# Patient Record
Sex: FEMALE | Race: BLACK OR AFRICAN AMERICAN | Employment: UNEMPLOYED | URBAN - METROPOLITAN AREA
[De-identification: names, ages, dates, MRNs, and addresses within clinical notes are randomized per-mention and may not be internally consistent; named-entity substitution may affect disease eponyms.]

---

## 2024-09-11 ENCOUNTER — APPOINTMENT (OUTPATIENT)
Dept: ULTRASOUND IMAGING | Age: 34
End: 2024-09-11
Payer: COMMERCIAL

## 2024-09-11 ENCOUNTER — APPOINTMENT (OUTPATIENT)
Dept: GENERAL RADIOLOGY | Age: 34
End: 2024-09-11
Payer: COMMERCIAL

## 2024-09-11 ENCOUNTER — HOSPITAL ENCOUNTER (EMERGENCY)
Age: 34
Discharge: HOME | End: 2024-09-12
Payer: COMMERCIAL

## 2024-09-11 VITALS
RESPIRATION RATE: 19 BRPM | HEART RATE: 105 BPM | OXYGEN SATURATION: 98 % | TEMPERATURE: 98.4 F | SYSTOLIC BLOOD PRESSURE: 103 MMHG | DIASTOLIC BLOOD PRESSURE: 69 MMHG

## 2024-09-11 DIAGNOSIS — I82.4Y2 ACUTE DEEP VEIN THROMBOSIS (DVT) OF PROXIMAL VEIN OF LEFT LOWER EXTREMITY (HCC): Primary | ICD-10-CM

## 2024-09-11 PROCEDURE — 99284 EMERGENCY DEPT VISIT MOD MDM: CPT

## 2024-09-11 PROCEDURE — 73502 X-RAY EXAM HIP UNI 2-3 VIEWS: CPT

## 2024-09-11 PROCEDURE — 93971 EXTREMITY STUDY: CPT

## 2024-09-18 ENCOUNTER — TELEPHONE (OUTPATIENT)
Dept: ONCOLOGY | Age: 34
End: 2024-09-18

## 2024-09-30 ENCOUNTER — HOSPITAL ENCOUNTER (OUTPATIENT)
Dept: INFUSION THERAPY | Age: 34
Discharge: HOME | End: 2024-09-30
Payer: COMMERCIAL

## 2024-09-30 ENCOUNTER — INITIAL CONSULT (OUTPATIENT)
Dept: ONCOLOGY | Age: 34
End: 2024-09-30
Payer: COMMERCIAL

## 2024-09-30 VITALS
DIASTOLIC BLOOD PRESSURE: 76 MMHG | HEIGHT: 56 IN | BODY MASS INDEX: 23.35 KG/M2 | HEART RATE: 81 BPM | OXYGEN SATURATION: 99 % | RESPIRATION RATE: 14 BRPM | SYSTOLIC BLOOD PRESSURE: 111 MMHG | TEMPERATURE: 98 F | WEIGHT: 103.8 LBS

## 2024-09-30 DIAGNOSIS — I82.4Y2 ACUTE DEEP VEIN THROMBOSIS (DVT) OF PROXIMAL VEIN OF LEFT LOWER EXTREMITY (HCC): Primary | ICD-10-CM

## 2024-09-30 DIAGNOSIS — I82.4Y2 ACUTE DEEP VEIN THROMBOSIS (DVT) OF PROXIMAL VEIN OF LEFT LOWER EXTREMITY (HCC): ICD-10-CM

## 2024-09-30 LAB
ALBUMIN SERPL-MCNC: 4.2 G/DL (ref 3.4–5)
ALBUMIN/GLOB SERPL: 1.5 {RATIO} (ref 1.1–2.2)
ALP SERPL-CCNC: 79 U/L (ref 40–129)
ALT SERPL-CCNC: 17 U/L (ref 10–40)
ANION GAP SERPL CALCULATED.3IONS-SCNC: 13 MMOL/L (ref 9–17)
AST SERPL-CCNC: 29 U/L (ref 15–37)
BASOPHILS # BLD: 0.03 K/UL
BASOPHILS NFR BLD: 1 % (ref 0–1)
BILIRUB SERPL-MCNC: 0.4 MG/DL (ref 0–1)
BUN SERPL-MCNC: 9 MG/DL (ref 7–20)
CALCIUM SERPL-MCNC: 9.4 MG/DL (ref 8.3–10.6)
CHLORIDE SERPL-SCNC: 103 MMOL/L (ref 99–110)
CO2 SERPL-SCNC: 20 MMOL/L (ref 21–32)
CREAT SERPL-MCNC: 0.8 MG/DL (ref 0.6–1.1)
EOSINOPHIL # BLD: 0.06 K/UL
EOSINOPHILS RELATIVE PERCENT: 2 % (ref 0–3)
ERYTHROCYTE [DISTWIDTH] IN BLOOD BY AUTOMATED COUNT: 21.3 % (ref 11.7–14.9)
GFR, ESTIMATED: 89 ML/MIN/1.73M2
GLUCOSE SERPL-MCNC: 72 MG/DL (ref 74–99)
HCT VFR BLD AUTO: 28.9 % (ref 37–47)
HCYS SERPL-SCNC: 7.5 UMOL/L
HGB BLD-MCNC: 8 G/DL (ref 12.5–16)
LYMPHOCYTES NFR BLD: 1.37 K/UL
LYMPHOCYTES RELATIVE PERCENT: 43 % (ref 24–44)
MCH RBC QN AUTO: 19.3 PG (ref 27–31)
MCHC RBC AUTO-ENTMCNC: 27.7 G/DL (ref 32–36)
MCV RBC AUTO: 69.6 FL (ref 78–100)
MONOCYTES NFR BLD: 0.23 K/UL
MONOCYTES NFR BLD: 7 % (ref 0–4)
NEUTROPHILS NFR BLD: 47 % (ref 36–66)
NEUTS SEG NFR BLD: 1.52 K/UL
PLATELET # BLD AUTO: 458 K/UL (ref 140–440)
PMV BLD AUTO: 11.2 FL (ref 7.5–11.1)
POTASSIUM SERPL-SCNC: 4.8 MMOL/L (ref 3.5–5.1)
PROT SERPL-MCNC: 7 G/DL (ref 6.4–8.2)
RBC # BLD AUTO: 4.15 M/UL (ref 4.2–5.4)
SODIUM SERPL-SCNC: 136 MMOL/L (ref 136–145)
WBC OTHER # BLD: 3.2 K/UL (ref 4–10.5)

## 2024-09-30 PROCEDURE — 83090 ASSAY OF HOMOCYSTEINE: CPT

## 2024-09-30 PROCEDURE — 80053 COMPREHEN METABOLIC PANEL: CPT

## 2024-09-30 PROCEDURE — 81291 MTHFR GENE: CPT

## 2024-09-30 PROCEDURE — 85300 ANTITHROMBIN III ACTIVITY: CPT

## 2024-09-30 PROCEDURE — 36415 COLL VENOUS BLD VENIPUNCTURE: CPT

## 2024-09-30 PROCEDURE — 99204 OFFICE O/P NEW MOD 45 MIN: CPT | Performed by: INTERNAL MEDICINE

## 2024-09-30 PROCEDURE — 99211 OFF/OP EST MAY X REQ PHY/QHP: CPT

## 2024-09-30 PROCEDURE — 81241 F5 GENE: CPT

## 2024-09-30 PROCEDURE — 85730 THROMBOPLASTIN TIME PARTIAL: CPT

## 2024-09-30 PROCEDURE — 85610 PROTHROMBIN TIME: CPT

## 2024-09-30 PROCEDURE — 85306 CLOT INHIBIT PROT S FREE: CPT

## 2024-09-30 PROCEDURE — 81240 F2 GENE: CPT

## 2024-09-30 PROCEDURE — 85303 CLOT INHIBIT PROT C ACTIVITY: CPT

## 2024-09-30 PROCEDURE — 86146 BETA-2 GLYCOPROTEIN ANTIBODY: CPT

## 2024-09-30 PROCEDURE — 85025 COMPLETE CBC W/AUTO DIFF WBC: CPT

## 2024-09-30 PROCEDURE — 85613 RUSSELL VIPER VENOM DILUTED: CPT

## 2024-09-30 ASSESSMENT — PATIENT HEALTH QUESTIONNAIRE - PHQ9
1. LITTLE INTEREST OR PLEASURE IN DOING THINGS: NOT AT ALL
SUM OF ALL RESPONSES TO PHQ9 QUESTIONS 1 & 2: 0
SUM OF ALL RESPONSES TO PHQ QUESTIONS 1-9: 0
SUM OF ALL RESPONSES TO PHQ QUESTIONS 1-9: 0
2. FEELING DOWN, DEPRESSED OR HOPELESS: NOT AT ALL
SUM OF ALL RESPONSES TO PHQ QUESTIONS 1-9: 0
SUM OF ALL RESPONSES TO PHQ QUESTIONS 1-9: 0

## 2024-09-30 NOTE — PROGRESS NOTES
Patient Name:  Courtney Capone  Patient :  1990  Patient MRN:  [id number]     Primary Oncologist: Mica Izquierdo MD  Referring Provider: No primary care provider on file.     Date of Service: 2024      Reason for Consult:  Left LE DVT      Chief Complaint:    Chief Complaint   Patient presents with    New Patient       Encounter Diagnosis   Name Primary?    Acute deep vein thrombosis (DVT) of proximal vein of left lower extremity (HCC) Yes        HPI:   2024, she arrived with her boyfriend to the clinic today.  Patient Creole speaking.   in the room.  Reported that she noticed edema in the left lower extremity to the foot, painful.  Denied any surgery or injury.  No long distance travel but reported that she drives 1-1/2-hour each way to work.  No prior history of blood clots.  No family history of thromboembolism.  No use of any estrogen or any steroids.  No smoking history.  Denies any chest pain, increased shortness of breath.  Denied any abdominal pain, nausea, vomiting, diarrhea, constipation.  No weight loss.  Ultrasound on 2024 with positive acute proximal DVT in the left lower extremity, no DVT in the calf.  Also superficial thrombophlebitis in the left lower extremity.    Past Medical History:     GERD, peptic ulcer disease                                                             Past Surgery History:     she had surgery for the peptic ulcer disease but prolonged hospitalization for about 5 months then she had repeat surgery in .  Also trach and PEG at that time                                                                    Social History:   Is with boyfriend.  No history of smoking tobacco, alcohol abuse or any other illicit drug abuse.                                                                                                        Family History:    No family history of any thromboembolism or any malignancy

## 2024-09-30 NOTE — PROGRESS NOTES
MA Rooming Questions  Patient: Courtney Capone  MRN: [id number]    Date: 9/30/2024        New Patient        5. Did the patient have a depression screening completed today? Yes    No data recorded     PHQ-9 Given to (if applicable):               PHQ-9 Score (if applicable):                     [] Positive     []  Negative              Does question #9 need addressed (if applicable)                     [] Yes    []  No               Marita Aguero MA

## 2024-10-02 LAB — AT III ACT/NOR PPP CHRO: 141 % (ref 76–128)

## 2024-10-03 LAB
CONFIRM APTT STACLOT: NORMAL S
DRVVT SCREEN TO CONFIRM RATIO: NORMAL {RATIO}
HEPARIN NT PPP QL: NORMAL
LA 3 SCREEN W REFLEX-IMP: NORMAL
LMW HEPARIN IND PLT AB SER QL: NORMAL
MIXING DRVVT: NORMAL S
NEUTRALIZED DRVVT SCREEN RATIO: NORMAL
PROTHROMBIN TIME: 13.8 S (ref 12–15.5)
SCREEN APTT: 0.93 S
SCREEN APTT: NORMAL S
SCREEN DRVVT: 1.08 S
THROMBIN TIME: NORMAL S

## 2024-10-04 LAB
F2 C.20210G>A GENO BLD/T: NEGATIVE
MTHFR INTERPRETATION: NORMAL
MTHFR MUTATION A1286C: NEGATIVE
MTHFR MUTATION C665T: NEGATIVE
SPECIMEN SOURCE: NORMAL
SPECIMEN: NORMAL

## 2024-10-05 LAB
F5 P.R506Q BLD/T QL: NEGATIVE
SPECIMEN SOURCE: NORMAL

## 2024-10-08 LAB
B2 GLYCOPROT1 IGA SERPL IA-ACNC: 3.4 ELISA U/ML (ref 0–7)
B2 GLYCOPROT1 IGG SERPL IA-ACNC: <0.6 ELISA U/ML (ref 0–7)
B2 GLYCOPROT1 IGM SERPL IA-ACNC: <0.9 ELISA U/ML (ref 0–7)

## 2024-11-07 ENCOUNTER — OFFICE VISIT (OUTPATIENT)
Dept: ONCOLOGY | Age: 34
End: 2024-11-07
Payer: COMMERCIAL

## 2024-11-07 ENCOUNTER — HOSPITAL ENCOUNTER (OUTPATIENT)
Dept: INFUSION THERAPY | Age: 34
Discharge: HOME | End: 2024-11-07
Payer: COMMERCIAL

## 2024-11-07 VITALS
TEMPERATURE: 97.6 F | SYSTOLIC BLOOD PRESSURE: 102 MMHG | RESPIRATION RATE: 14 BRPM | OXYGEN SATURATION: 100 % | WEIGHT: 105.8 LBS | HEIGHT: 56 IN | DIASTOLIC BLOOD PRESSURE: 55 MMHG | BODY MASS INDEX: 23.8 KG/M2 | HEART RATE: 61 BPM

## 2024-11-07 DIAGNOSIS — D50.0 IRON DEFICIENCY ANEMIA DUE TO CHRONIC BLOOD LOSS: ICD-10-CM

## 2024-11-07 DIAGNOSIS — K90.9 INTESTINAL MALABSORPTION, UNSPECIFIED TYPE: ICD-10-CM

## 2024-11-07 DIAGNOSIS — I82.4Y2 ACUTE DEEP VEIN THROMBOSIS (DVT) OF PROXIMAL VEIN OF LEFT LOWER EXTREMITY (HCC): ICD-10-CM

## 2024-11-07 DIAGNOSIS — I82.4Y2 ACUTE DEEP VEIN THROMBOSIS (DVT) OF PROXIMAL VEIN OF LEFT LOWER EXTREMITY (HCC): Primary | ICD-10-CM

## 2024-11-07 LAB
25(OH)D3 SERPL-MCNC: 13.2 NG/ML (ref 30–150)
ALBUMIN SERPL-MCNC: 4.3 G/DL (ref 3.4–5)
ALBUMIN/GLOB SERPL: 1.4 {RATIO} (ref 1.1–2.2)
ALP SERPL-CCNC: 84 U/L (ref 40–129)
ALT SERPL-CCNC: 16 U/L (ref 10–40)
ANION GAP SERPL CALCULATED.3IONS-SCNC: 13 MMOL/L (ref 9–17)
AST SERPL-CCNC: 26 U/L (ref 15–37)
BILIRUB SERPL-MCNC: 0.6 MG/DL (ref 0–1)
BUN SERPL-MCNC: 11 MG/DL (ref 7–20)
CALCIUM SERPL-MCNC: 9.6 MG/DL (ref 8.3–10.6)
CHLORIDE SERPL-SCNC: 102 MMOL/L (ref 99–110)
CO2 SERPL-SCNC: 22 MMOL/L (ref 21–32)
CREAT SERPL-MCNC: 0.7 MG/DL (ref 0.6–1.1)
FERRITIN SERPL-MCNC: 7 NG/ML (ref 15–150)
FOLATE SERPL-MCNC: 10.9 NG/ML (ref 4.8–24.2)
GFR, ESTIMATED: >90 ML/MIN/1.73M2
GLUCOSE SERPL-MCNC: 82 MG/DL (ref 74–99)
IRON SATN MFR SERPL: 4 % (ref 15–50)
IRON SERPL-MCNC: 19 UG/DL (ref 37–145)
POTASSIUM SERPL-SCNC: 4.5 MMOL/L (ref 3.5–5.1)
PROT SERPL-MCNC: 7.2 G/DL (ref 6.4–8.2)
SODIUM SERPL-SCNC: 137 MMOL/L (ref 136–145)
TIBC SERPL-MCNC: 495 UG/DL (ref 260–445)
UNSATURATED IRON BINDING CAPACITY: 476 UG/DL (ref 110–370)
VIT B12 SERPL-MCNC: 346 PG/ML (ref 211–911)

## 2024-11-07 PROCEDURE — 99211 OFF/OP EST MAY X REQ PHY/QHP: CPT

## 2024-11-07 PROCEDURE — 82306 VITAMIN D 25 HYDROXY: CPT

## 2024-11-07 PROCEDURE — 83550 IRON BINDING TEST: CPT

## 2024-11-07 PROCEDURE — 82746 ASSAY OF FOLIC ACID SERUM: CPT

## 2024-11-07 PROCEDURE — 36415 COLL VENOUS BLD VENIPUNCTURE: CPT

## 2024-11-07 PROCEDURE — 80053 COMPREHEN METABOLIC PANEL: CPT

## 2024-11-07 PROCEDURE — 82607 VITAMIN B-12: CPT

## 2024-11-07 PROCEDURE — 99214 OFFICE O/P EST MOD 30 MIN: CPT | Performed by: INTERNAL MEDICINE

## 2024-11-07 PROCEDURE — 83021 HEMOGLOBIN CHROMOTOGRAPHY: CPT

## 2024-11-07 PROCEDURE — 83540 ASSAY OF IRON: CPT

## 2024-11-07 PROCEDURE — 82728 ASSAY OF FERRITIN: CPT

## 2024-11-07 NOTE — PROGRESS NOTES
Patient Name:  Courtney Capone  Patient :  1990  Patient MRN:  [id number]     Primary Oncologist: Mica Izquierdo MD  Referring Provider: No primary care provider on file.     Date of Service: 2024      Reason for Consult:  Left LE DVT      Chief Complaint:    Chief Complaint   Patient presents with    Follow-up       Encounter Diagnosis   Name Primary?    Acute deep vein thrombosis (DVT) of proximal vein of left lower extremity (HCC) Yes        HPI:   2024, she arrived with her boyfriend to the clinic today.  Patient Creole speaking.   in the room.  Reported that she noticed edema in the left lower extremity to the foot, painful.  Denied any surgery or injury.  No long distance travel but reported that she drives 1-1/2-hour each way to work.  No prior history of blood clots.  No family history of thromboembolism.  No use of any estrogen or any steroids.  No smoking history.  Denies any chest pain, increased shortness of breath.  Denied any abdominal pain, nausea, vomiting, diarrhea, constipation.  No weight loss.  Ultrasound on 2024 with positive acute proximal DVT in the left lower extremity, no DVT in the calf.  Also superficial thrombophlebitis in the left lower extremity.    2024 CBC with WBC of 3.2 hemoglobin of 8 hematocrit 28.9 MCV of 69.6 and platelets of 458 CMP basically within normal limits beta-2 glycoprotein negative homocysteine levels 7.5 cardiolipin antibodies were ordered but not done Antithrombin III activity 141 factor V Leiden mutation negative MTHFR mutation negative prothrombin gene mutation negative Lupus anticoagulant not detected    Past Medical History:     GERD, peptic ulcer disease                                                             Past Surgery History:     she had surgery for the peptic ulcer disease but prolonged hospitalization for about 5 months then she had repeat surgery in .  Also trach and PEG at that time

## 2024-11-07 NOTE — PROGRESS NOTES
MA Rooming Questions  Patient: Courtney Capone  MRN: [id number]    Date: 11/7/2024        1. Do you have any new issues?   no         2. Do you need any refills on medications?    no    3. Have you had any imaging done since your last visit?   yes - [facility]    4. Have you been hospitalized or seen in the emergency room since your last visit here?   yes - [facility] ER MTH AGO    5. Did the patient have a depression screening completed today? No    No data recorded     PHQ-9 Given to (if applicable):               PHQ-9 Score (if applicable):                     [] Positive     []  Negative              Does question #9 need addressed (if applicable)                     [] Yes    []  No               Marita Aguero MA

## 2024-11-08 DIAGNOSIS — E55.9 VITAMIN D DEFICIENCY: Primary | ICD-10-CM

## 2024-11-08 DIAGNOSIS — D50.0 IRON DEFICIENCY ANEMIA DUE TO CHRONIC BLOOD LOSS: ICD-10-CM

## 2024-11-08 DIAGNOSIS — K90.9 INTESTINAL MALABSORPTION, UNSPECIFIED TYPE: ICD-10-CM

## 2024-11-08 DIAGNOSIS — E53.9 VITAMIN B DEFICIENCY: ICD-10-CM

## 2024-11-08 RX ORDER — SODIUM CHLORIDE 0.9 % (FLUSH) 0.9 %
5-40 SYRINGE (ML) INJECTION PRN
OUTPATIENT
Start: 2024-11-08

## 2024-11-08 RX ORDER — ALBUTEROL SULFATE 90 UG/1
4 INHALANT RESPIRATORY (INHALATION) PRN
OUTPATIENT
Start: 2024-11-08

## 2024-11-08 RX ORDER — SODIUM CHLORIDE 9 MG/ML
5-250 INJECTION, SOLUTION INTRAVENOUS PRN
OUTPATIENT
Start: 2024-11-08

## 2024-11-08 RX ORDER — HEPARIN SODIUM (PORCINE) LOCK FLUSH IV SOLN 100 UNIT/ML 100 UNIT/ML
500 SOLUTION INTRAVENOUS PRN
OUTPATIENT
Start: 2024-11-08

## 2024-11-08 RX ORDER — ONDANSETRON 2 MG/ML
8 INJECTION INTRAMUSCULAR; INTRAVENOUS
OUTPATIENT
Start: 2024-11-08

## 2024-11-08 RX ORDER — DIPHENHYDRAMINE HYDROCHLORIDE 50 MG/ML
50 INJECTION INTRAMUSCULAR; INTRAVENOUS
OUTPATIENT
Start: 2024-11-08

## 2024-11-08 RX ORDER — SODIUM CHLORIDE 9 MG/ML
INJECTION, SOLUTION INTRAVENOUS CONTINUOUS
OUTPATIENT
Start: 2024-11-08

## 2024-11-08 RX ORDER — EPINEPHRINE 1 MG/ML
0.3 INJECTION, SOLUTION, CONCENTRATE INTRAVENOUS PRN
OUTPATIENT
Start: 2024-11-08

## 2024-11-08 RX ORDER — MEPERIDINE HYDROCHLORIDE 50 MG/ML
12.5 INJECTION INTRAMUSCULAR; INTRAVENOUS; SUBCUTANEOUS PRN
OUTPATIENT
Start: 2024-11-08

## 2024-11-08 RX ORDER — FAMOTIDINE 10 MG/ML
20 INJECTION, SOLUTION INTRAVENOUS
OUTPATIENT
Start: 2024-11-08

## 2024-11-08 RX ORDER — CYANOCOBALAMIN 1000 UG/ML
1000 INJECTION, SOLUTION INTRAMUSCULAR; SUBCUTANEOUS ONCE
OUTPATIENT
Start: 2024-11-08 | End: 2024-11-08

## 2024-11-08 RX ORDER — ACETAMINOPHEN 325 MG/1
650 TABLET ORAL
OUTPATIENT
Start: 2024-11-08

## 2024-11-08 RX ORDER — ACETAMINOPHEN 325 MG/1
650 TABLET ORAL ONCE
OUTPATIENT
Start: 2024-11-08 | End: 2024-11-08

## 2024-11-08 RX ORDER — ERGOCALCIFEROL 1.25 MG/1
50000 CAPSULE, LIQUID FILLED ORAL WEEKLY
Qty: 12 CAPSULE | Refills: 1 | Status: SHIPPED | OUTPATIENT
Start: 2024-11-08

## 2024-11-08 NOTE — PROGRESS NOTES
Patient needs B12 injections for vitamin b12 deficiency. Order for B12 monthly placed Therapy plan added. Patient needs iron infusion for iron deficiency anemia d/t chronic blood loss. Order for Infed and NN referral placed. Therapy plan added. This nurse called language line services for an  to assist with calling the patient  ID # [id number]. However there was no answer, the  left a VM requesting the patient to return the call to review lab results.

## 2024-11-09 LAB
CARDIOLIPIN IGA SER IA-ACNC: <10 APL
CARDIOLIPIN IGG SER IA-ACNC: <10 GPL
CARDIOLIPIN IGM SER IA-ACNC: <10 MPL
HGB A1 MFR BLD: 97.2 % (ref 95–97.9)
HGB A2 MFR BLD: 2.3 % (ref 2–3.5)
HGB C MFR BLD: 0 % (ref 0–0)
HGB E MFR BLD: 0 % (ref 0–0)
HGB F MFR BLD: 0.5 % (ref 0–2.1)
HGB FRACT BLD ELPH-IMP: NORMAL
HGB OTHER MFR BLD: 0 % (ref 0–0)
HGB S BLD QL SOLY: NORMAL
HGB S MFR BLD: 0 % (ref 0–0)
PATH INTERP BLD-IMP: NORMAL

## 2024-11-11 ENCOUNTER — TELEPHONE (OUTPATIENT)
Dept: INFUSION THERAPY | Age: 34
End: 2024-11-11

## 2024-11-11 NOTE — TELEPHONE ENCOUNTER
THOR'D FOR B12. PT REQUESTED ADDRESS BE EMAILED TO HER. SCANNED TO HER EMAIL.           PT CB TO [facility] B12 INJ. GOT  ON THE LINE. PT DISCONNECTED MID CALL.  TRIED TO CB AND CALL WENT TO . WILL CB MONDAY

## 2024-11-15 ENCOUNTER — TELEPHONE (OUTPATIENT)
Dept: INFUSION THERAPY | Age: 34
End: 2024-11-15

## 2024-11-21 ENCOUNTER — TELEPHONE (OUTPATIENT)
Dept: INFUSION THERAPY | Age: 34
End: 2024-11-21

## 2024-12-03 ENCOUNTER — HOSPITAL ENCOUNTER (OUTPATIENT)
Dept: INFUSION THERAPY | Age: 34
Discharge: HOME | End: 2024-12-03
Payer: COMMERCIAL

## 2024-12-03 ENCOUNTER — TELEPHONE (OUTPATIENT)
Dept: INFUSION THERAPY | Age: 34
End: 2024-12-03

## 2024-12-03 DIAGNOSIS — E53.9 VITAMIN B DEFICIENCY: Primary | ICD-10-CM

## 2024-12-03 PROCEDURE — 6360000002 HC RX W HCPCS: Performed by: INTERNAL MEDICINE

## 2024-12-03 PROCEDURE — 96372 THER/PROPH/DIAG INJ SC/IM: CPT

## 2024-12-03 RX ORDER — ALBUTEROL SULFATE 90 UG/1
4 INHALANT RESPIRATORY (INHALATION) PRN
OUTPATIENT
Start: 2024-12-31

## 2024-12-03 RX ORDER — EPINEPHRINE 1 MG/ML
0.3 INJECTION, SOLUTION, CONCENTRATE INTRAVENOUS PRN
OUTPATIENT
Start: 2024-12-31

## 2024-12-03 RX ORDER — HYDROCORTISONE SODIUM SUCCINATE 100 MG/2ML
100 INJECTION INTRAMUSCULAR; INTRAVENOUS
OUTPATIENT
Start: 2024-12-31

## 2024-12-03 RX ORDER — ACETAMINOPHEN 325 MG/1
650 TABLET ORAL
OUTPATIENT
Start: 2024-12-31

## 2024-12-03 RX ORDER — SODIUM CHLORIDE 9 MG/ML
INJECTION, SOLUTION INTRAVENOUS CONTINUOUS
OUTPATIENT
Start: 2024-12-31

## 2024-12-03 RX ORDER — FAMOTIDINE 10 MG/ML
20 INJECTION, SOLUTION INTRAVENOUS
OUTPATIENT
Start: 2024-12-31

## 2024-12-03 RX ORDER — CYANOCOBALAMIN 1000 UG/ML
1000 INJECTION, SOLUTION INTRAMUSCULAR; SUBCUTANEOUS ONCE
Status: COMPLETED | OUTPATIENT
Start: 2024-12-03 | End: 2024-12-03

## 2024-12-03 RX ORDER — CYANOCOBALAMIN 1000 UG/ML
1000 INJECTION, SOLUTION INTRAMUSCULAR; SUBCUTANEOUS ONCE
OUTPATIENT
Start: 2024-12-31 | End: 2024-12-31

## 2024-12-03 RX ORDER — ONDANSETRON 2 MG/ML
8 INJECTION INTRAMUSCULAR; INTRAVENOUS
OUTPATIENT
Start: 2024-12-31

## 2024-12-03 RX ORDER — DIPHENHYDRAMINE HYDROCHLORIDE 50 MG/ML
50 INJECTION INTRAMUSCULAR; INTRAVENOUS
OUTPATIENT
Start: 2024-12-31

## 2024-12-03 RX ADMIN — CYANOCOBALAMIN 1000 MCG: 1000 INJECTION, SOLUTION INTRAMUSCULAR at 08:43

## 2024-12-03 NOTE — PROGRESS NOTES
Pt to tx suite for B-12 injection.  used at all times.  session # [id number]. Pt had no concerns or issues to discuss at this time. Injection given IM to left deltoid by Vickie GUERRA. Pt tolerated without incident left ambulatory

## 2024-12-20 ENCOUNTER — TELEPHONE (OUTPATIENT)
Dept: INFUSION THERAPY | Age: 34
End: 2024-12-20

## 2024-12-31 ENCOUNTER — HOSPITAL ENCOUNTER (OUTPATIENT)
Dept: INFUSION THERAPY | Age: 34
Discharge: HOME | End: 2024-12-31
Payer: COMMERCIAL

## 2024-12-31 VITALS
TEMPERATURE: 98.2 F | SYSTOLIC BLOOD PRESSURE: 102 MMHG | DIASTOLIC BLOOD PRESSURE: 64 MMHG | HEART RATE: 70 BPM | OXYGEN SATURATION: 100 % | RESPIRATION RATE: 16 BRPM

## 2024-12-31 DIAGNOSIS — E53.9 VITAMIN B DEFICIENCY: Primary | ICD-10-CM

## 2024-12-31 DIAGNOSIS — D50.0 IRON DEFICIENCY ANEMIA DUE TO CHRONIC BLOOD LOSS: Primary | ICD-10-CM

## 2024-12-31 DIAGNOSIS — K90.9 INTESTINAL MALABSORPTION, UNSPECIFIED TYPE: ICD-10-CM

## 2024-12-31 PROCEDURE — 6360000002 HC RX W HCPCS: Performed by: INTERNAL MEDICINE

## 2024-12-31 PROCEDURE — 96365 THER/PROPH/DIAG IV INF INIT: CPT

## 2024-12-31 PROCEDURE — 96372 THER/PROPH/DIAG INJ SC/IM: CPT

## 2024-12-31 PROCEDURE — 2580000003 HC RX 258: Performed by: INTERNAL MEDICINE

## 2024-12-31 PROCEDURE — 96366 THER/PROPH/DIAG IV INF ADDON: CPT

## 2024-12-31 PROCEDURE — 96376 TX/PRO/DX INJ SAME DRUG ADON: CPT

## 2024-12-31 PROCEDURE — 96375 TX/PRO/DX INJ NEW DRUG ADDON: CPT

## 2024-12-31 PROCEDURE — 6370000000 HC RX 637 (ALT 250 FOR IP): Performed by: INTERNAL MEDICINE

## 2024-12-31 RX ORDER — ONDANSETRON 2 MG/ML
8 INJECTION INTRAMUSCULAR; INTRAVENOUS
OUTPATIENT
Start: 2024-12-31

## 2024-12-31 RX ORDER — MEPERIDINE HYDROCHLORIDE 25 MG/ML
12.5 INJECTION INTRAMUSCULAR; INTRAVENOUS; SUBCUTANEOUS PRN
OUTPATIENT
Start: 2024-12-31

## 2024-12-31 RX ORDER — SODIUM CHLORIDE 0.9 % (FLUSH) 0.9 %
5-40 SYRINGE (ML) INJECTION PRN
OUTPATIENT
Start: 2024-12-31

## 2024-12-31 RX ORDER — CYANOCOBALAMIN 1000 UG/ML
1000 INJECTION, SOLUTION INTRAMUSCULAR; SUBCUTANEOUS ONCE
OUTPATIENT
Start: 2025-01-28 | End: 2025-01-28

## 2024-12-31 RX ORDER — ACETAMINOPHEN 325 MG/1
650 TABLET ORAL ONCE
Status: COMPLETED | OUTPATIENT
Start: 2024-12-31 | End: 2024-12-31

## 2024-12-31 RX ORDER — SODIUM CHLORIDE 9 MG/ML
INJECTION, SOLUTION INTRAVENOUS CONTINUOUS
OUTPATIENT
Start: 2025-01-28

## 2024-12-31 RX ORDER — SODIUM CHLORIDE 9 MG/ML
5-250 INJECTION, SOLUTION INTRAVENOUS PRN
Status: DISCONTINUED | OUTPATIENT
Start: 2024-12-31 | End: 2025-01-01 | Stop reason: HOSPADM

## 2024-12-31 RX ORDER — ACETAMINOPHEN 325 MG/1
650 TABLET ORAL
OUTPATIENT
Start: 2025-01-28

## 2024-12-31 RX ORDER — CYANOCOBALAMIN 1000 UG/ML
1000 INJECTION, SOLUTION INTRAMUSCULAR; SUBCUTANEOUS ONCE
Status: COMPLETED | OUTPATIENT
Start: 2024-12-31 | End: 2024-12-31

## 2024-12-31 RX ORDER — DIPHENHYDRAMINE HYDROCHLORIDE 50 MG/ML
50 INJECTION INTRAMUSCULAR; INTRAVENOUS
OUTPATIENT
Start: 2024-12-31

## 2024-12-31 RX ORDER — HYDROCORTISONE SODIUM SUCCINATE 100 MG/2ML
100 INJECTION INTRAMUSCULAR; INTRAVENOUS
OUTPATIENT
Start: 2024-12-31

## 2024-12-31 RX ORDER — SODIUM CHLORIDE 9 MG/ML
5-250 INJECTION, SOLUTION INTRAVENOUS PRN
Status: CANCELLED | OUTPATIENT
Start: 2024-12-31

## 2024-12-31 RX ORDER — HYDROCORTISONE SODIUM SUCCINATE 100 MG/2ML
100 INJECTION INTRAMUSCULAR; INTRAVENOUS
OUTPATIENT
Start: 2025-01-28

## 2024-12-31 RX ORDER — EPINEPHRINE 1 MG/ML
0.3 INJECTION, SOLUTION, CONCENTRATE INTRAVENOUS PRN
OUTPATIENT
Start: 2025-01-28

## 2024-12-31 RX ORDER — DEXAMETHASONE SODIUM PHOSPHATE 10 MG/ML
10 INJECTION INTRAMUSCULAR; INTRAVENOUS ONCE
Status: CANCELLED | OUTPATIENT
Start: 2024-12-31 | End: 2024-12-31

## 2024-12-31 RX ORDER — ALBUTEROL SULFATE 90 UG/1
4 INHALANT RESPIRATORY (INHALATION) PRN
OUTPATIENT
Start: 2025-01-28

## 2024-12-31 RX ORDER — DIPHENHYDRAMINE HYDROCHLORIDE 50 MG/ML
50 INJECTION INTRAMUSCULAR; INTRAVENOUS
OUTPATIENT
Start: 2025-01-28

## 2024-12-31 RX ORDER — SODIUM CHLORIDE 0.9 % (FLUSH) 0.9 %
5-40 SYRINGE (ML) INJECTION PRN
Status: DISCONTINUED | OUTPATIENT
Start: 2024-12-31 | End: 2025-01-01 | Stop reason: HOSPADM

## 2024-12-31 RX ORDER — HEPARIN 100 UNIT/ML
500 SYRINGE INTRAVENOUS PRN
OUTPATIENT
Start: 2024-12-31

## 2024-12-31 RX ORDER — EPINEPHRINE 1 MG/ML
0.3 INJECTION, SOLUTION, CONCENTRATE INTRAVENOUS PRN
OUTPATIENT
Start: 2024-12-31

## 2024-12-31 RX ORDER — SODIUM CHLORIDE 9 MG/ML
INJECTION, SOLUTION INTRAVENOUS CONTINUOUS
OUTPATIENT
Start: 2024-12-31

## 2024-12-31 RX ORDER — ACETAMINOPHEN 325 MG/1
650 TABLET ORAL ONCE
Status: CANCELLED | OUTPATIENT
Start: 2024-12-31 | End: 2024-12-31

## 2024-12-31 RX ORDER — FAMOTIDINE 10 MG/ML
20 INJECTION, SOLUTION INTRAVENOUS
OUTPATIENT
Start: 2025-01-28

## 2024-12-31 RX ORDER — ONDANSETRON 2 MG/ML
8 INJECTION INTRAMUSCULAR; INTRAVENOUS
OUTPATIENT
Start: 2025-01-28

## 2024-12-31 RX ORDER — ALBUTEROL SULFATE 90 UG/1
4 INHALANT RESPIRATORY (INHALATION) PRN
OUTPATIENT
Start: 2024-12-31

## 2024-12-31 RX ORDER — FAMOTIDINE 10 MG/ML
20 INJECTION, SOLUTION INTRAVENOUS
OUTPATIENT
Start: 2024-12-31

## 2024-12-31 RX ORDER — ACETAMINOPHEN 325 MG/1
650 TABLET ORAL
OUTPATIENT
Start: 2024-12-31

## 2024-12-31 RX ORDER — DEXAMETHASONE SODIUM PHOSPHATE 10 MG/ML
10 INJECTION INTRAMUSCULAR; INTRAVENOUS ONCE
Status: COMPLETED | OUTPATIENT
Start: 2024-12-31 | End: 2024-12-31

## 2024-12-31 RX ORDER — SODIUM CHLORIDE 9 MG/ML
5-250 INJECTION, SOLUTION INTRAVENOUS PRN
OUTPATIENT
Start: 2024-12-31

## 2024-12-31 RX ADMIN — DEXAMETHASONE SODIUM PHOSPHATE 10 MG: 10 INJECTION INTRAMUSCULAR; INTRAVENOUS at 09:20

## 2024-12-31 RX ADMIN — CYANOCOBALAMIN 1000 MCG: 1000 INJECTION, SOLUTION INTRAMUSCULAR at 09:01

## 2024-12-31 RX ADMIN — SODIUM CHLORIDE 975 MG: 9 INJECTION, SOLUTION INTRAVENOUS at 09:28

## 2024-12-31 RX ADMIN — SODIUM CHLORIDE 25 MG: 9 INJECTION, SOLUTION INTRAVENOUS at 08:03

## 2024-12-31 RX ADMIN — ACETAMINOPHEN 650 MG: 325 TABLET ORAL at 09:20

## 2024-12-31 ASSESSMENT — PAIN SCALES - GENERAL: PAINLEVEL_OUTOF10: 0

## 2024-12-31 NOTE — PROGRESS NOTES
Patient arrived to treatment suite for Infed with test dose and vitamin B-12 injection.   Peter #[id number] utilized for admission and assessment.  PIV started in right arm and good blood return noted.  Education on Infed provided.  Patient has no questions or concerns for the doctor at this time.  Treatment approved and given.  Vitamin B-12 injection given IM in right deltoid, band-aid applied.  One-hour post test dose monitoring observed.  Patient tolerated well.   Anmol #[id number] utilized for discharge instructions.  Patient voiced understanding of when to RTC.  Left treatment suite ambulatory.

## 2025-01-12 ENCOUNTER — APPOINTMENT (OUTPATIENT)
Dept: CT IMAGING | Age: 35
End: 2025-01-12
Payer: COMMERCIAL

## 2025-01-12 ENCOUNTER — HOSPITAL ENCOUNTER (EMERGENCY)
Age: 35
Discharge: HOME | End: 2025-01-12
Payer: COMMERCIAL

## 2025-01-12 VITALS
HEART RATE: 80 BPM | DIASTOLIC BLOOD PRESSURE: 75 MMHG | SYSTOLIC BLOOD PRESSURE: 103 MMHG | TEMPERATURE: 98.3 F | RESPIRATION RATE: 16 BRPM | OXYGEN SATURATION: 99 %

## 2025-01-12 DIAGNOSIS — R10.84 GENERALIZED ABDOMINAL PAIN: ICD-10-CM

## 2025-01-12 DIAGNOSIS — N30.00 ACUTE CYSTITIS WITHOUT HEMATURIA: Primary | ICD-10-CM

## 2025-01-12 LAB
ALBUMIN SERPL-MCNC: 4.3 G/DL (ref 3.4–5)
ALBUMIN/GLOB SERPL: 1.6 {RATIO} (ref 1.1–2.2)
ALP SERPL-CCNC: 65 U/L (ref 40–129)
ALT SERPL-CCNC: 16 U/L (ref 10–40)
ANION GAP SERPL CALCULATED.3IONS-SCNC: 12 MMOL/L (ref 9–17)
AST SERPL-CCNC: 23 U/L (ref 15–37)
B-HCG SERPL EIA 3RD IS-ACNC: <1 MIU/ML
BACTERIA URNS QL MICRO: ABNORMAL
BASOPHILS # BLD: 0.04 K/UL
BASOPHILS NFR BLD: 1 % (ref 0–1)
BILIRUB DIRECT SERPL-MCNC: <0.2 MG/DL (ref 0–0.3)
BILIRUB INDIRECT SERPL-MCNC: NORMAL MG/DL (ref 0–0.7)
BILIRUB SERPL-MCNC: 0.6 MG/DL (ref 0–1)
BILIRUB UR QL STRIP: NEGATIVE
BUN SERPL-MCNC: 20 MG/DL (ref 7–20)
CALCIUM SERPL-MCNC: 10.1 MG/DL (ref 8.3–10.6)
CHLORIDE SERPL-SCNC: 101 MMOL/L (ref 99–110)
CLARITY UR: CLEAR
CO2 SERPL-SCNC: 27 MMOL/L (ref 21–32)
COLOR UR: YELLOW
CREAT SERPL-MCNC: 0.8 MG/DL (ref 0.6–1.1)
EOSINOPHIL # BLD: 0.02 K/UL
EOSINOPHILS RELATIVE PERCENT: 0 % (ref 0–3)
EPI CELLS #/AREA URNS HPF: 3 /HPF
ERYTHROCYTE [DISTWIDTH] IN BLOOD BY AUTOMATED COUNT: 32.8 % (ref 11.7–14.9)
GFR, ESTIMATED: 85 ML/MIN/1.73M2
GLUCOSE SERPL-MCNC: 83 MG/DL (ref 74–99)
GLUCOSE UR STRIP-MCNC: NEGATIVE MG/DL
HCG UR QL: NEGATIVE
HCT VFR BLD AUTO: 39 % (ref 37–47)
HGB BLD-MCNC: 10.9 G/DL (ref 12.5–16)
HGB UR QL STRIP.AUTO: NEGATIVE
IMM GRANULOCYTES # BLD AUTO: 0.01 K/UL
IMM GRANULOCYTES NFR BLD: 0 %
KETONES UR STRIP-MCNC: 15 MG/DL
LEUKOCYTE ESTERASE UR QL STRIP: NEGATIVE
LIPASE SERPL-CCNC: 16 U/L (ref 13–60)
LYMPHOCYTES NFR BLD: 0.98 K/UL
LYMPHOCYTES RELATIVE PERCENT: 20 % (ref 24–44)
MCH RBC QN AUTO: 20.9 PG (ref 27–31)
MCHC RBC AUTO-ENTMCNC: 27.9 G/DL (ref 32–36)
MCV RBC AUTO: 74.9 FL (ref 78–100)
MONOCYTES NFR BLD: 0.24 K/UL
MONOCYTES NFR BLD: 5 % (ref 0–4)
MUCOUS THREADS URNS QL MICRO: ABNORMAL
NEUTROPHILS NFR BLD: 74 % (ref 36–66)
NEUTS SEG NFR BLD: 3.66 K/UL
NITRITE UR QL STRIP: POSITIVE
PH UR STRIP: 8 [PH] (ref 5–8)
PLATELET, FLUORESCENCE: 320 K/UL (ref 140–440)
POTASSIUM SERPL-SCNC: 4.4 MMOL/L (ref 3.5–5.1)
PROT SERPL-MCNC: 7 G/DL (ref 6.4–8.2)
PROT UR STRIP-MCNC: NEGATIVE MG/DL
RBC # BLD AUTO: 5.21 M/UL (ref 4.2–5.4)
RBC #/AREA URNS HPF: 1 /HPF (ref 0–2)
SODIUM SERPL-SCNC: 141 MMOL/L (ref 136–145)
SP GR UR STRIP: 1.01 (ref 1–1.03)
UROBILINOGEN UR STRIP-ACNC: 0.2 EU/DL (ref 0–1)
WBC #/AREA URNS HPF: 8 /HPF (ref 0–5)
WBC OTHER # BLD: 5 K/UL (ref 4–10.5)

## 2025-01-12 PROCEDURE — 83690 ASSAY OF LIPASE: CPT

## 2025-01-12 PROCEDURE — 84703 CHORIONIC GONADOTROPIN ASSAY: CPT

## 2025-01-12 PROCEDURE — 6360000004 HC RX CONTRAST MEDICATION

## 2025-01-12 PROCEDURE — 2500000003 HC RX 250 WO HCPCS

## 2025-01-12 PROCEDURE — 99285 EMERGENCY DEPT VISIT HI MDM: CPT

## 2025-01-12 PROCEDURE — 85025 COMPLETE CBC W/AUTO DIFF WBC: CPT

## 2025-01-12 PROCEDURE — 80053 COMPREHEN METABOLIC PANEL: CPT

## 2025-01-12 PROCEDURE — 96374 THER/PROPH/DIAG INJ IV PUSH: CPT

## 2025-01-12 PROCEDURE — 6360000002 HC RX W HCPCS

## 2025-01-12 PROCEDURE — 81001 URINALYSIS AUTO W/SCOPE: CPT

## 2025-01-12 PROCEDURE — 82248 BILIRUBIN DIRECT: CPT

## 2025-01-12 PROCEDURE — 74177 CT ABD & PELVIS W/CONTRAST: CPT

## 2025-01-12 PROCEDURE — 96375 TX/PRO/DX INJ NEW DRUG ADDON: CPT

## 2025-01-12 PROCEDURE — 84702 CHORIONIC GONADOTROPIN TEST: CPT

## 2025-01-12 RX ORDER — MORPHINE SULFATE 4 MG/ML
4 INJECTION, SOLUTION INTRAMUSCULAR; INTRAVENOUS
Status: COMPLETED | OUTPATIENT
Start: 2025-01-12 | End: 2025-01-12

## 2025-01-12 RX ORDER — METOCLOPRAMIDE HYDROCHLORIDE 5 MG/ML
10 INJECTION INTRAMUSCULAR; INTRAVENOUS ONCE
Status: COMPLETED | OUTPATIENT
Start: 2025-01-12 | End: 2025-01-12

## 2025-01-12 RX ORDER — IOPAMIDOL 755 MG/ML
75 INJECTION, SOLUTION INTRAVASCULAR
Status: COMPLETED | OUTPATIENT
Start: 2025-01-12 | End: 2025-01-12

## 2025-01-12 RX ORDER — CEFUROXIME AXETIL 500 MG/1
500 TABLET ORAL 2 TIMES DAILY
Qty: 14 TABLET | Refills: 0 | Status: SHIPPED | OUTPATIENT
Start: 2025-01-12 | End: 2025-01-19

## 2025-01-12 RX ORDER — ONDANSETRON 4 MG/1
4 TABLET, FILM COATED ORAL DAILY PRN
Qty: 30 TABLET | Refills: 0 | Status: SHIPPED | OUTPATIENT
Start: 2025-01-12

## 2025-01-12 RX ORDER — ONDANSETRON 2 MG/ML
4 INJECTION INTRAMUSCULAR; INTRAVENOUS ONCE
Status: COMPLETED | OUTPATIENT
Start: 2025-01-12 | End: 2025-01-12

## 2025-01-12 RX ADMIN — ONDANSETRON 4 MG: 2 INJECTION INTRAMUSCULAR; INTRAVENOUS at 11:23

## 2025-01-12 RX ADMIN — IOPAMIDOL 75 ML: 755 INJECTION, SOLUTION INTRAVENOUS at 15:23

## 2025-01-12 RX ADMIN — WATER 1000 MG: 1 INJECTION INTRAMUSCULAR; INTRAVENOUS; SUBCUTANEOUS at 15:45

## 2025-01-12 RX ADMIN — MORPHINE SULFATE 4 MG: 4 INJECTION, SOLUTION INTRAMUSCULAR; INTRAVENOUS at 11:24

## 2025-01-12 RX ADMIN — METOCLOPRAMIDE HYDROCHLORIDE 10 MG: 5 INJECTION INTRAMUSCULAR; INTRAVENOUS at 15:45

## 2025-01-12 ASSESSMENT — PAIN DESCRIPTION - DESCRIPTORS: DESCRIPTORS: ACHING

## 2025-01-12 ASSESSMENT — PAIN DESCRIPTION - LOCATION: LOCATION: ABDOMEN

## 2025-01-12 ASSESSMENT — PAIN SCALES - GENERAL: PAINLEVEL_OUTOF10: 6

## 2025-01-12 NOTE — ED PROVIDER NOTES
[facility] EMERGENCY DEPARTMENT  EMERGENCY DEPARTMENT ENCOUNTER        Pt Name: Courtney Capone  MRN: [id number]  Birthdate 1990  Date of evaluation: 1/12/2025  Provider: Andrés Araya PA-C  PCP: No primary care provider on file.  Note Started: 10:43 AM EST 1/12/25      MAKI. I have evaluated this patient.        CHIEF COMPLAINT       Chief Complaint   Patient presents with    Abdominal Pain       HISTORY OF PRESENT ILLNESS: 1 or more Elements     Courtney Capone is a 34 y.o. female with iron deficiency anemia and history of DVT no longer anticoagulated who presents complaining of lower abdominal pain.  States that it will sometimes radiate towards her back, denies any fever, chills, states that she has had more so urinary urgency, denies any explicit burning.  Denies any concern for STDs, vaginal bleeding.  States that her last social period was January 4.  Endorses both nausea and vomiting.    Nursing Notes were all reviewed and agreed with or any disagreements were addressed in the HPI.    Historians other than the patient: Significant other at the bedside    Limitations to history : Language hatian creole only    Social determinants of health that will affect patient's care are:  Poor Health Literacy (Additional Time Provided in Explanation)  Poor access to outpatient care/follow up (Provided Outpatient Resources)    I reviewed and interpreted prior non-ED medical record specialty: Outpatient visit with the hematology oncology team from November 7, 2024.  Follow-up for DVT.    PAST MEDICAL HISTORY      has no past medical history on file.     CURRENTMEDICATIONS       Previous Medications    APIXABAN (ELIQUIS) 5 MG TABS TABLET    Take 1 tablet by mouth 2 times daily    APIXABAN STARTER PACK (ELIQUIS) 5 MG TBPK TABLET    Take 1 tablet by mouth See Admin Instructions    OMEPRAZOLE PO    Take by mouth    VITAMIN D (ERGOCALCIFEROL) 1.25 MG (74296 UT) CAPS CAPSULE    Take 1 capsule by mouth

## 2025-01-12 NOTE — CARE COORDINATION
Cm review of pt chart for discharge needs. Community resources placed in pt chart for outpatient needs for continued care and community services. DILLONRN/CM

## 2025-01-12 NOTE — DISCHARGE INSTRUCTIONS
[facility] are available for hospital follow up appointments. Follow up appointments are important and should be schedule BEFORE you leave the hospital. They will help keep you well and at home instead of in the hospital.      Please also schedule a New Patient Appointment with the Primary Care Provider (PCP) of your choice. It normally takes a few weeks to get an appointment with a new PCP, so call TODAY. The PCP list below are all known to be accepting new patients.     If issues arise, call your health insurance for in network PCP options.  [facility] P[facility]    [facility] Physician Finder/Scheduling   [phone]    Dr. Shi, Dr. Aldrichan- [facility]  [phone]  [address], [address]   (All insurances accepted)    Dr. Malcolm Stevens DO - [facility]   [address] [place] [address] [id number] [phone]   (All insurances accepted)    Dr. Flores - [facility]   [phone]  [address]  (No managed or traditional Medicaid)     Katia Rojas [facility] - Medical Offices  [phone]  [address]   (All insurances accepted)    Heber BONILLA & Pete BONILLA - [facility]   [phone]  [address], [address]  [place] [address]  (No [facility] Medicaid Community Plan or [place] Medicaid (they can take [place] Dual)     Dr. Lowery   [phone]   [address], [address]  (pending insurance/ do not accept most Medicaid plans)      Dr. Zainab Yates and Julienne Gar, APRN-[facility] [place].  [phone]

## 2025-01-12 NOTE — ED NOTES
Through the use of an , this RN provided discharge education to patient regarding follow up care and prescription medication. Patient verbalized understanding. Respirations even and unlabored, skin warm and dry.

## 2025-01-23 ENCOUNTER — APPOINTMENT (OUTPATIENT)
Dept: CT IMAGING | Age: 35
DRG: 379 | End: 2025-01-23
Payer: COMMERCIAL

## 2025-01-23 ENCOUNTER — HOSPITAL ENCOUNTER (INPATIENT)
Age: 35
LOS: 1 days | Discharge: HOME | DRG: 379 | End: 2025-01-27
Attending: EMERGENCY MEDICINE | Admitting: HOSPITALIST
Payer: COMMERCIAL

## 2025-01-23 DIAGNOSIS — K56.600 PARTIAL SMALL BOWEL OBSTRUCTION (HCC): ICD-10-CM

## 2025-01-23 DIAGNOSIS — K92.2 GASTROINTESTINAL HEMORRHAGE, UNSPECIFIED GASTROINTESTINAL HEMORRHAGE TYPE: ICD-10-CM

## 2025-01-23 DIAGNOSIS — R11.2 NAUSEA AND VOMITING, UNSPECIFIED VOMITING TYPE: Primary | ICD-10-CM

## 2025-01-23 DIAGNOSIS — R10.13 ABDOMINAL PAIN, EPIGASTRIC: ICD-10-CM

## 2025-01-23 DIAGNOSIS — Z86.718 HX OF DEEP VENOUS THROMBOSIS: ICD-10-CM

## 2025-01-23 DIAGNOSIS — R10.13 EPIGASTRIC PAIN: ICD-10-CM

## 2025-01-23 LAB
ALBUMIN SERPL-MCNC: 4.8 G/DL (ref 3.4–5)
ALBUMIN/GLOB SERPL: 1.6 {RATIO} (ref 1.1–2.2)
ALP SERPL-CCNC: 71 U/L (ref 40–129)
ALT SERPL-CCNC: 15 U/L (ref 10–40)
ANION GAP SERPL CALCULATED.3IONS-SCNC: 15 MMOL/L (ref 9–17)
AST SERPL-CCNC: 34 U/L (ref 15–37)
B-HCG SERPL EIA 3RD IS-ACNC: <1 MIU/ML
BASOPHILS # BLD: 0.02 K/UL
BASOPHILS NFR BLD: 1 % (ref 0–1)
BILIRUB SERPL-MCNC: 1.1 MG/DL (ref 0–1)
BUN SERPL-MCNC: 17 MG/DL (ref 7–20)
CALCIUM SERPL-MCNC: 10.9 MG/DL (ref 8.3–10.6)
CHLORIDE SERPL-SCNC: 98 MMOL/L (ref 99–110)
CO2 SERPL-SCNC: 30 MMOL/L (ref 21–32)
CREAT SERPL-MCNC: 0.8 MG/DL (ref 0.6–1.1)
EOSINOPHIL # BLD: 0 K/UL
EOSINOPHILS RELATIVE PERCENT: 0 % (ref 0–3)
GFR, ESTIMATED: 79 ML/MIN/1.73M2
GLUCOSE SERPL-MCNC: 95 MG/DL (ref 74–99)
HCT VFR BLD AUTO: 46 % (ref 37–47)
HGB BLD-MCNC: 13.3 G/DL (ref 12.5–16)
IMM GRANULOCYTES # BLD AUTO: 0.01 K/UL
IMM GRANULOCYTES NFR BLD: 0 %
LIPASE SERPL-CCNC: 16 U/L (ref 13–60)
LYMPHOCYTES NFR BLD: 1.08 K/UL
LYMPHOCYTES RELATIVE PERCENT: 27 % (ref 24–44)
MCH RBC QN AUTO: 22.5 PG (ref 27–31)
MCHC RBC AUTO-ENTMCNC: 28.9 G/DL (ref 32–36)
MCV RBC AUTO: 78 FL (ref 78–100)
MONOCYTES NFR BLD: 0.2 K/UL
MONOCYTES NFR BLD: 5 % (ref 0–4)
NEUTROPHILS NFR BLD: 67 % (ref 36–66)
NEUTS SEG NFR BLD: 2.66 K/UL
PLATELET, FLUORESCENCE: 323 K/UL (ref 140–440)
POTASSIUM SERPL-SCNC: 4.6 MMOL/L (ref 3.5–5.1)
PROT SERPL-MCNC: 7.8 G/DL (ref 6.4–8.2)
RBC # BLD AUTO: 5.9 M/UL (ref 4.2–5.4)
SODIUM SERPL-SCNC: 143 MMOL/L (ref 136–145)
WBC OTHER # BLD: 4 K/UL (ref 4–10.5)

## 2025-01-23 PROCEDURE — 96375 TX/PRO/DX INJ NEW DRUG ADDON: CPT

## 2025-01-23 PROCEDURE — 80053 COMPREHEN METABOLIC PANEL: CPT

## 2025-01-23 PROCEDURE — 85610 PROTHROMBIN TIME: CPT

## 2025-01-23 PROCEDURE — 96374 THER/PROPH/DIAG INJ IV PUSH: CPT

## 2025-01-23 PROCEDURE — 85025 COMPLETE CBC W/AUTO DIFF WBC: CPT

## 2025-01-23 PROCEDURE — 83690 ASSAY OF LIPASE: CPT

## 2025-01-23 PROCEDURE — 84702 CHORIONIC GONADOTROPIN TEST: CPT

## 2025-01-23 PROCEDURE — 74177 CT ABD & PELVIS W/CONTRAST: CPT

## 2025-01-23 PROCEDURE — 6360000002 HC RX W HCPCS: Performed by: EMERGENCY MEDICINE

## 2025-01-23 RX ORDER — METOCLOPRAMIDE HYDROCHLORIDE 5 MG/ML
10 INJECTION INTRAMUSCULAR; INTRAVENOUS ONCE
Status: COMPLETED | OUTPATIENT
Start: 2025-01-23 | End: 2025-01-23

## 2025-01-23 RX ORDER — IOPAMIDOL 755 MG/ML
75 INJECTION, SOLUTION INTRAVASCULAR
Status: COMPLETED | OUTPATIENT
Start: 2025-01-23 | End: 2025-01-24

## 2025-01-23 RX ORDER — MORPHINE SULFATE 4 MG/ML
4 INJECTION, SOLUTION INTRAMUSCULAR; INTRAVENOUS ONCE
Status: COMPLETED | OUTPATIENT
Start: 2025-01-23 | End: 2025-01-23

## 2025-01-23 RX ORDER — PANTOPRAZOLE SODIUM 40 MG/10ML
40 INJECTION, POWDER, LYOPHILIZED, FOR SOLUTION INTRAVENOUS ONCE
Status: COMPLETED | OUTPATIENT
Start: 2025-01-23 | End: 2025-01-23

## 2025-01-23 RX ORDER — DIPHENHYDRAMINE HYDROCHLORIDE 50 MG/ML
25 INJECTION INTRAMUSCULAR; INTRAVENOUS ONCE
Status: COMPLETED | OUTPATIENT
Start: 2025-01-23 | End: 2025-01-23

## 2025-01-23 RX ORDER — KETOROLAC TROMETHAMINE 15 MG/ML
15 INJECTION, SOLUTION INTRAMUSCULAR; INTRAVENOUS ONCE
Status: COMPLETED | OUTPATIENT
Start: 2025-01-23 | End: 2025-01-23

## 2025-01-23 RX ADMIN — MORPHINE SULFATE 4 MG: 4 INJECTION, SOLUTION INTRAMUSCULAR; INTRAVENOUS at 23:07

## 2025-01-23 RX ADMIN — KETOROLAC TROMETHAMINE 15 MG: 15 INJECTION, SOLUTION INTRAMUSCULAR; INTRAVENOUS at 23:04

## 2025-01-23 RX ADMIN — METOCLOPRAMIDE HYDROCHLORIDE 10 MG: 5 INJECTION, SOLUTION INTRAMUSCULAR; INTRAVENOUS at 23:01

## 2025-01-23 RX ADMIN — PANTOPRAZOLE SODIUM 40 MG: 40 INJECTION, POWDER, FOR SOLUTION INTRAVENOUS at 22:55

## 2025-01-23 RX ADMIN — DIPHENHYDRAMINE HYDROCHLORIDE 25 MG: 50 INJECTION, SOLUTION INTRAMUSCULAR; INTRAVENOUS at 22:57

## 2025-01-23 ASSESSMENT — PAIN - FUNCTIONAL ASSESSMENT: PAIN_FUNCTIONAL_ASSESSMENT: 0-10

## 2025-01-23 ASSESSMENT — PAIN SCALES - GENERAL
PAINLEVEL_OUTOF10: 7
PAINLEVEL_OUTOF10: 7

## 2025-01-23 ASSESSMENT — PAIN DESCRIPTION - LOCATION
LOCATION: ABDOMEN
LOCATION: ABDOMEN

## 2025-01-23 ASSESSMENT — LIFESTYLE VARIABLES
HOW OFTEN DO YOU HAVE A DRINK CONTAINING ALCOHOL: NEVER
HOW MANY STANDARD DRINKS CONTAINING ALCOHOL DO YOU HAVE ON A TYPICAL DAY: PATIENT DOES NOT DRINK

## 2025-01-24 PROBLEM — R10.13 EPIGASTRIC PAIN: Status: ACTIVE | Noted: 2025-01-24

## 2025-01-24 LAB
ALBUMIN SERPL-MCNC: 3.7 G/DL (ref 3.4–5)
ALBUMIN/GLOB SERPL: 1.5 {RATIO} (ref 1.1–2.2)
ALP SERPL-CCNC: 53 U/L (ref 40–129)
ALT SERPL-CCNC: 12 U/L (ref 10–40)
ANION GAP SERPL CALCULATED.3IONS-SCNC: 12 MMOL/L (ref 9–17)
AST SERPL-CCNC: 15 U/L (ref 15–37)
BASOPHILS # BLD: 0.04 K/UL
BASOPHILS NFR BLD: 1 % (ref 0–1)
BILIRUB SERPL-MCNC: 1 MG/DL (ref 0–1)
BILIRUB UR QL STRIP: NEGATIVE
BUN SERPL-MCNC: 20 MG/DL (ref 7–20)
CALCIUM SERPL-MCNC: 8.9 MG/DL (ref 8.3–10.6)
CHLORIDE SERPL-SCNC: 104 MMOL/L (ref 99–110)
CLARITY UR: ABNORMAL
CO2 SERPL-SCNC: 26 MMOL/L (ref 21–32)
COLOR UR: YELLOW
CREAT SERPL-MCNC: 0.8 MG/DL (ref 0.6–1.1)
CRYSTALS URNS MICRO: ABNORMAL /HPF
EOSINOPHIL # BLD: 0.04 K/UL
EOSINOPHILS RELATIVE PERCENT: 1 % (ref 0–3)
EPI CELLS #/AREA URNS HPF: 5 /HPF
GFR, ESTIMATED: 82 ML/MIN/1.73M2
GLUCOSE SERPL-MCNC: 79 MG/DL (ref 74–99)
GLUCOSE UR STRIP-MCNC: NEGATIVE MG/DL
HCT VFR BLD AUTO: 35.9 % (ref 37–47)
HGB BLD-MCNC: 10.3 G/DL (ref 12.5–16)
HGB UR QL STRIP.AUTO: NEGATIVE
IMM GRANULOCYTES # BLD AUTO: 0.01 K/UL
IMM GRANULOCYTES NFR BLD: 0 %
INR PPP: 0.9
INR PPP: 1.1
IRON SATN MFR SERPL: 22 % (ref 15–50)
IRON SERPL-MCNC: 58 UG/DL (ref 37–145)
KETONES UR STRIP-MCNC: NEGATIVE MG/DL
LEUKOCYTE ESTERASE UR QL STRIP: ABNORMAL
LYMPHOCYTES NFR BLD: 1.78 K/UL
LYMPHOCYTES RELATIVE PERCENT: 40 % (ref 24–44)
MAGNESIUM SERPL-MCNC: 1.8 MG/DL (ref 1.8–2.4)
MCH RBC QN AUTO: 22.8 PG (ref 27–31)
MCHC RBC AUTO-ENTMCNC: 28.7 G/DL (ref 32–36)
MCV RBC AUTO: 79.4 FL (ref 78–100)
MONOCYTES NFR BLD: 0.37 K/UL
MONOCYTES NFR BLD: 8 % (ref 0–4)
MUCOUS THREADS URNS QL MICRO: ABNORMAL
NEUTROPHILS NFR BLD: 49 % (ref 36–66)
NEUTS SEG NFR BLD: 2.17 K/UL
NITRITE UR QL STRIP: NEGATIVE
PH UR STRIP: 8 [PH] (ref 5–8)
PLATELET, FLUORESCENCE: 271 K/UL (ref 140–440)
POTASSIUM SERPL-SCNC: 3.5 MMOL/L (ref 3.5–5.1)
PROT SERPL-MCNC: 6.2 G/DL (ref 6.4–8.2)
PROT UR STRIP-MCNC: ABNORMAL MG/DL
PROTHROMBIN TIME: 12.8 SEC (ref 11.7–14.5)
PROTHROMBIN TIME: 14.1 SEC (ref 11.7–14.5)
RBC # BLD AUTO: 4.52 M/UL (ref 4.2–5.4)
RBC #/AREA URNS HPF: 0 /HPF (ref 0–2)
SODIUM SERPL-SCNC: 142 MMOL/L (ref 136–145)
SP GR UR STRIP: 1.01 (ref 1–1.03)
TIBC SERPL-MCNC: 259 UG/DL (ref 260–445)
UNSATURATED IRON BINDING CAPACITY: 201 UG/DL (ref 110–370)
UROBILINOGEN UR STRIP-ACNC: 1 EU/DL (ref 0–1)
WBC #/AREA URNS HPF: 11 /HPF (ref 0–5)
WBC OTHER # BLD: 4.4 K/UL (ref 4–10.5)

## 2025-01-24 PROCEDURE — 96376 TX/PRO/DX INJ SAME DRUG ADON: CPT

## 2025-01-24 PROCEDURE — 36415 COLL VENOUS BLD VENIPUNCTURE: CPT

## 2025-01-24 PROCEDURE — 6360000002 HC RX W HCPCS: Performed by: INTERNAL MEDICINE

## 2025-01-24 PROCEDURE — 85025 COMPLETE CBC W/AUTO DIFF WBC: CPT

## 2025-01-24 PROCEDURE — 6360000004 HC RX CONTRAST MEDICATION: Performed by: EMERGENCY MEDICINE

## 2025-01-24 PROCEDURE — 83735 ASSAY OF MAGNESIUM: CPT

## 2025-01-24 PROCEDURE — 99285 EMERGENCY DEPT VISIT HI MDM: CPT

## 2025-01-24 PROCEDURE — G0378 HOSPITAL OBSERVATION PER HR: HCPCS

## 2025-01-24 PROCEDURE — 2580000003 HC RX 258: Performed by: INTERNAL MEDICINE

## 2025-01-24 PROCEDURE — 80053 COMPREHEN METABOLIC PANEL: CPT

## 2025-01-24 PROCEDURE — 83540 ASSAY OF IRON: CPT

## 2025-01-24 PROCEDURE — 2580000003 HC RX 258: Performed by: STUDENT IN AN ORGANIZED HEALTH CARE EDUCATION/TRAINING PROGRAM

## 2025-01-24 PROCEDURE — 94761 N-INVAS EAR/PLS OXIMETRY MLT: CPT

## 2025-01-24 PROCEDURE — 81001 URINALYSIS AUTO W/SCOPE: CPT

## 2025-01-24 PROCEDURE — 99222 1ST HOSP IP/OBS MODERATE 55: CPT | Performed by: SURGERY

## 2025-01-24 PROCEDURE — 83550 IRON BINDING TEST: CPT

## 2025-01-24 RX ORDER — SODIUM CHLORIDE, SODIUM LACTATE, POTASSIUM CHLORIDE, CALCIUM CHLORIDE 600; 310; 30; 20 MG/100ML; MG/100ML; MG/100ML; MG/100ML
INJECTION, SOLUTION INTRAVENOUS CONTINUOUS
Status: ACTIVE | OUTPATIENT
Start: 2025-01-24 | End: 2025-01-24

## 2025-01-24 RX ORDER — ONDANSETRON 2 MG/ML
4 INJECTION INTRAMUSCULAR; INTRAVENOUS EVERY 6 HOURS PRN
Status: DISCONTINUED | OUTPATIENT
Start: 2025-01-24 | End: 2025-01-27 | Stop reason: HOSPADM

## 2025-01-24 RX ORDER — POTASSIUM CHLORIDE 7.45 MG/ML
10 INJECTION INTRAVENOUS PRN
Status: DISCONTINUED | OUTPATIENT
Start: 2025-01-24 | End: 2025-01-27 | Stop reason: HOSPADM

## 2025-01-24 RX ORDER — POLYETHYLENE GLYCOL 3350 17 G/17G
17 POWDER, FOR SOLUTION ORAL DAILY PRN
Status: DISCONTINUED | OUTPATIENT
Start: 2025-01-24 | End: 2025-01-27 | Stop reason: HOSPADM

## 2025-01-24 RX ORDER — ENOXAPARIN SODIUM 100 MG/ML
40 INJECTION SUBCUTANEOUS DAILY
Status: DISCONTINUED | OUTPATIENT
Start: 2025-01-24 | End: 2025-01-24

## 2025-01-24 RX ORDER — MAGNESIUM SULFATE IN WATER 40 MG/ML
2000 INJECTION, SOLUTION INTRAVENOUS PRN
Status: DISCONTINUED | OUTPATIENT
Start: 2025-01-24 | End: 2025-01-27 | Stop reason: HOSPADM

## 2025-01-24 RX ORDER — SODIUM CHLORIDE 0.9 % (FLUSH) 0.9 %
5-40 SYRINGE (ML) INJECTION EVERY 12 HOURS SCHEDULED
Status: DISCONTINUED | OUTPATIENT
Start: 2025-01-24 | End: 2025-01-27 | Stop reason: HOSPADM

## 2025-01-24 RX ORDER — ONDANSETRON 4 MG/1
4 TABLET, ORALLY DISINTEGRATING ORAL EVERY 8 HOURS PRN
Status: DISCONTINUED | OUTPATIENT
Start: 2025-01-24 | End: 2025-01-27 | Stop reason: HOSPADM

## 2025-01-24 RX ORDER — SODIUM CHLORIDE 9 MG/ML
INJECTION, SOLUTION INTRAVENOUS PRN
Status: DISCONTINUED | OUTPATIENT
Start: 2025-01-24 | End: 2025-01-27 | Stop reason: HOSPADM

## 2025-01-24 RX ORDER — ACETAMINOPHEN 650 MG/1
650 SUPPOSITORY RECTAL EVERY 6 HOURS PRN
Status: DISCONTINUED | OUTPATIENT
Start: 2025-01-24 | End: 2025-01-27 | Stop reason: HOSPADM

## 2025-01-24 RX ORDER — SODIUM CHLORIDE 0.9 % (FLUSH) 0.9 %
5-40 SYRINGE (ML) INJECTION PRN
Status: DISCONTINUED | OUTPATIENT
Start: 2025-01-24 | End: 2025-01-27 | Stop reason: HOSPADM

## 2025-01-24 RX ORDER — PANTOPRAZOLE SODIUM 40 MG/10ML
40 INJECTION, POWDER, LYOPHILIZED, FOR SOLUTION INTRAVENOUS DAILY
Status: DISCONTINUED | OUTPATIENT
Start: 2025-01-24 | End: 2025-01-24

## 2025-01-24 RX ORDER — POTASSIUM CHLORIDE 1500 MG/1
40 TABLET, EXTENDED RELEASE ORAL PRN
Status: DISCONTINUED | OUTPATIENT
Start: 2025-01-24 | End: 2025-01-27 | Stop reason: HOSPADM

## 2025-01-24 RX ORDER — ACETAMINOPHEN 325 MG/1
650 TABLET ORAL EVERY 6 HOURS PRN
Status: DISCONTINUED | OUTPATIENT
Start: 2025-01-24 | End: 2025-01-27 | Stop reason: HOSPADM

## 2025-01-24 RX ADMIN — PANTOPRAZOLE SODIUM 40 MG: 40 INJECTION, POWDER, FOR SOLUTION INTRAVENOUS at 20:45

## 2025-01-24 RX ADMIN — PANTOPRAZOLE SODIUM 40 MG: 40 INJECTION, POWDER, FOR SOLUTION INTRAVENOUS at 10:22

## 2025-01-24 RX ADMIN — IOPAMIDOL 75 ML: 755 INJECTION, SOLUTION INTRAVENOUS at 00:12

## 2025-01-24 RX ADMIN — SODIUM CHLORIDE, POTASSIUM CHLORIDE, SODIUM LACTATE AND CALCIUM CHLORIDE: 600; 310; 30; 20 INJECTION, SOLUTION INTRAVENOUS at 05:39

## 2025-01-24 ASSESSMENT — PAIN - FUNCTIONAL ASSESSMENT
PAIN_FUNCTIONAL_ASSESSMENT: NONE - DENIES PAIN

## 2025-01-24 NOTE — CONSULTS
[facility]    [facility]  Gastroenterology Consultation    2025  10:08 AM  _________________________________________________________________________  Patient:    Courtney Capone  : 1990   34 y.o.             MRN: [id number]  Admitted: 2025  9:42 PM ATT: Alan Maxwell MD   OBS /[id number]  AdmitDx: Abdominal pain, epigastric [R10.13]  Epigastric pain [R10.13]  Partial small bowel obstruction (HCC) [K56.600]  Nausea and vomiting, unspecified vomiting type [R11.2] PCP: No primary care provider on file.  _________________________________________________________________________    Reason for Consult:  nausea  Requesting Physician:  Alan Maxwell MD    _________________________________________________________________________  IMPRESSION and RECOMMENDATIONS:    The patient is a 34 y.o. female with hx per HPI. GI consulted for upper GI bleed.     Coffee ground emesis  JAZMIN - labs in   H/o DVT on Eliquis    Discussion:  Given patient's presentation the differential includes PUD, erosions, esophagitis, gastritis, Elizabeth-Jameson tear, angioectasias, or Dieulafoy's lesion.    Plan:   - Maintain two large bore IV's, type and cross, trend H&H regularly   - H/H relatively stable right now.   - PPI IV BID  - Ok for CLD today  - Hold Eliquis for now.   - IV iron infusion  - Keep NPO at MN for possible EGD tomorrow  - Dr. Leal covering tomorrow. He will follow up and see if she needs EGD. If no EGD over the weekend then plan for outpatient EGD next week in clinic. We can f/u with her on Monday in clinic.     Please notify the GI on-call if a change in the patient's clinical condition indicates that more urgent/emergent endoscopy is needed.     Patient Active Problem List   Diagnosis Code    Vitamin B deficiency E53.9    Intestinal malabsorption K90.9    Iron deficiency anemia due to chronic blood loss D50.0    Epigastric pain R10.13

## 2025-01-24 NOTE — PROGRESS NOTES
Pt eating popsicles and drinking water, no complaints of nausea at this time. Pt states \"The only thing I feel is hungry.\".

## 2025-01-24 NOTE — CONSULTS
Session ID: [id number]  Language: Jacob Stokes   ID: #[id number]   Name: Gabriela Mohs Histo Method Verbiage: Each section was then chromacoded and processed in the Mohs lab using the Mohs protocol and submitted for frozen section.

## 2025-01-24 NOTE — ED NOTES
Resting in bed with eyes closed  NAD noted.   Skin w/d oral mucosa moist pink lips nailbeds pink brisk crf, resp easy unlabored with symmetrical rise and fall of chest wall.        Call light within reach, bed in low position,   Declines needs currently.     Denies pain at present.

## 2025-01-24 NOTE — ED NOTES
Resting in bed with eyes closed, awakens immediately upon speaking name.   NAD noted.   Skin w/d oral mucosa moist pink lips nailbeds pink brisk crf, resp easy unlabored with symmetrical rise and fall of chest wall.       Call light within reach, bed in low position,   Declines needs currently.

## 2025-01-24 NOTE — PROGRESS NOTES
Patient/caregivers speak Jordanian Creole as their preferred language for their healthcare communication. For safe communication, use the [employer]  carts or call:      Jacob Hair Senior -Navigator [phone]    General phone: [phone] ( [phone]) Email: [email or web address]      Please always document the use of interpreting services ('s ID number) in your clinical notes.      Our interpreters are available for team members working with limited English proficient (LEP) patients remotely, via phone or video or in person (if needed for special cases).      When using family members to interpret, for the safety of the patient and protection of the communication of both our patient and [facility] staff the VRI or telephonic  should remain on the line to monitor that all communication is accurate and complete. The  should be instructed to notify [facility] staff immediately if there are any inaccuracies.    Thank you,      Stanley Mclain  Senior /Navigator  Language Services Department  [phone]

## 2025-01-24 NOTE — ED NOTES
ED TO INPATIENT SBAR HANDOFF    Patient Name: Courtney Capone   :  1990  34 y.o.   Preferred Name    Family/Caregiver Present no   Restraints no   C-SSRS: Risk of Suicide: No Risk  Sitter no   Sepsis Risk Score        Situation  Chief Complaint   Patient presents with    Abdominal Pain     X1 day RLQ     Brief Description of Patient's Condition: RLQ abdominal pain   Mental Status: oriented, alert, coherent, logical, thought processes intact, and able to concentrate and follow conversation  Arrived from: home    Imaging:   CT ABDOMEN PELVIS W IV CONTRAST Additional Contrast? None   Final Result        Abnormal labs:   Abnormal Labs Reviewed   COMPREHENSIVE METABOLIC PANEL - Abnormal; Notable for the following components:       Result Value    Chloride 98 (*)     Calcium 10.9 (*)     Total Bilirubin 1.1 (*)     All other components within normal limits   CBC WITH AUTO DIFFERENTIAL - Abnormal; Notable for the following components:    RBC 5.90 (*)     MCH 22.5 (*)     MCHC 28.9 (*)     Neutrophils % 67 (*)     Monocytes % 5 (*)     All other components within normal limits        Background  History: No past medical history on file.    Assessment    Vitals:    Level of Consciousness: Alert (0)   Vitals:    25 0032 25 0041 25 0101 25 0202   BP: 99/61 100/62 97/61 (!) 96/58   Pulse:   81 84   Resp:   16 15   Temp:       TempSrc:       SpO2: 99% 100% 99% 100%       PO Status: Nothing by Mouth    O2 Flow Rate: O2 Device: None (Room air)      Cardiac Rhythm:      Last documented pain medication administered: 2304 toradol, 2307 morphine     NIH Score: NIH       Active LDA's:   Peripheral IV 25 Right;Anterior Forearm (Active)       Pertinent or High Risk Medications/Drips: no   If Yes, please provide details:        Blood Product Administration: no  If Yes, please provide details:      Recommendation    Incomplete orders   Additional Comments:    If any further questions, please call

## 2025-01-24 NOTE — PROGRESS NOTES
Patient seen at bedside, with  services assistance.  Newly admitted overnight, patient seen by GI today, plan for EGD in a.m.  Denies CP, SOB, N/V/D/Abd pain.  Denies hematemesis, or melena.  Hemoglobin stable, iron stores ordered, are normal with the exception of TIBC.  Iron infusion ordered per recommendations of GI, however will hold for now in lieu of iron levels.  Will discuss with GI regarding holding IV iron..  Patient discussed in IDR's, and with attending provider.     Bell Henderson, Acute Care CNP

## 2025-01-24 NOTE — CONSULTS
Department of General Surgery   Surgical Service Dr. Brito   Consult Note    Date of Consult: 1/24/25    Reason for Consult:  dark emesis, nausea  Requesting Physician:  Dr. Emir Greene    CHIEF COMPLAINT:  epigastric pain, nausea, dark emesis    History Obtained From:  patient    HISTORY OF PRESENT ILLNESS:    The patient is a 34 y.o. female who presented with nausea and vomiting for the past 3 days. She began having some dark black vomit concerning for blood and presented to the ER. She has some mild residual epigastric pain. Not currently nauseous. She reports history of gastric ulcer requiring surgery then complication requiring a second intestinal surgery. These were done in [facility].  She reports it has been a long time since she has had an EGD.  She also has a history of DVT last fall.  This presented as leg numbness.  She has been on Eliquis but has been out of this medication and not taking it for the past month.  She does report taking omeprazole.          Past Medical History:    Past Medical History:   Diagnosis Date    DVT (deep venous thrombosis) (HCC)     Gastric ulcer        Past Surgical History:    Past Surgical History:   Procedure Laterality Date    STOMACH SURGERY      surgery x2 on stomach and small bowel in [facility] for bleeding ulcers       Current Medications:   Current Facility-Administered Medications   Medication Dose Route Frequency Provider Last Rate Last Admin    [Held by provider] apixaban (ELIQUIS) tablet 5 mg  5 mg Oral BID Alan Maxwell MD        sodium chloride flush 0.9 % injection 5-40 mL  5-40 mL IntraVENous 2 times per day Alan Maxwell MD        sodium chloride flush 0.9 % injection 5-40 mL  5-40 mL IntraVENous PRN Alan Maxwell MD        0.9 % sodium chloride infusion   IntraVENous PRN Alan Maxwell MD        potassium chloride (KLOR-CON M) extended release tablet 40 mEq  40 mEq Oral PRN Alan Maxwell MD        Or    potassium bicarb-citric acid (EFFER-K) effervescent  tablet 40 mEq  40 mEq Oral PRN Alan Maxwell MD        Or    potassium chloride 10 mEq/100 mL IVPB (Peripheral Line)  10 mEq IntraVENous PRN Alan Maxwell MD        potassium chloride 10 mEq/100 mL IVPB (Peripheral Line)  10 mEq IntraVENous PRN Alan Maxwell MD        magnesium sulfate 2000 mg in 50 mL IVPB premix  2,000 mg IntraVENous PRN Alan Maxwell MD        ondansetron (ZOFRAN-ODT) disintegrating tablet 4 mg  4 mg Oral Q8H PRN Alan Maxwell MD        Or    ondansetron (ZOFRAN) injection 4 mg  4 mg IntraVENous Q6H PRN Alan Maxwell MD        polyethylene glycol (GLYCOLAX) packet 17 g  17 g Oral Daily PRN Alan Maxwell MD        acetaminophen (TYLENOL) tablet 650 mg  650 mg Oral Q6H PRN Alan Maxwell MD        Or    acetaminophen (TYLENOL) suppository 650 mg  650 mg Rectal Q6H PRN Alan Maxwell MD        lactated ringers infusion   IntraVENous Continuous Alan Maxwell  mL/hr at 01/24/25 0539 New Bag at 01/24/25 0539    pantoprazole (PROTONIX) 40 mg in sodium chloride (PF) 0.9 % 10 mL injection  40 mg IntraVENous Q12H Randy Yusuf MD   40 mg at 01/24/25 1022       Allergies:  Patient has no known allergies.    Social History:   Social History     Socioeconomic History    Marital status: Single   Tobacco Use    Smoking status: Never    Smokeless tobacco: Never     Social Determinants of Health     Food Insecurity: Food Insecurity Present (1/24/2025)    Hunger Vital Sign     Worried About Running Out of Food in the Last Year: Sometimes true     Ran Out of Food in the Last Year: Sometimes true   Transportation Needs: No Transportation Needs (1/24/2025)    PRAPARE - Transportation     Lack of Transportation (Medical): No     Lack of Transportation (Non-Medical): No   Housing Stability: Low Risk  (1/24/2025)    Housing Stability Vital Sign     Unable to Pay for Housing in the Last Year: No     Number of Times Moved in the Last Year: 0     Homeless in the Last Year: No       Family History:   No

## 2025-01-24 NOTE — H&P
History and Physical      Name:  Courtney Capone /Age/Sex: 1990  (34 y.o. female)   MRN & CSN:  [id number] & [id number] Encounter Date/Time: 2025 5:03 AM EST   Location:  ED17/ED-17 PCP: No primary care provider on file.       Hospital Day: 2    Assessment and Plan:     Patient is a 34 y.o. female who presented with Abdominal Pain (X1 day RLQ)    Epigastric pain   - Pt presents with epigastric pain and N/V x1 day with an episode of dark emesis in ED. No evidence of infection. CT abdomen shows Multiple mildly dilated loops of proximal small bowel with decompressed distal small bowel but discrete transition point is identified, partial obstruction could also be considered. AT this time patient with BM and passing gas, thus no concern for SBO. Given CT findings and episode of dark emesis will admit for obs. Continue PPI, monitor for progression into SBO or possible UGIB at which will consult GI. Supportive care. If epigastric pain continues, consider testing for H.pylori       Hx of DVT  - Continue eliquis     Checklist:  Advanced directive: full  Diet: NPO   DVT ppx: eliquis       Disposition: place in observation.  Estimated discharge: 2 day(s).  Current living situation: home.  Expected disposition: home.    Spoke with ED provider who recommended admission for the patient and I agree with that plan.  Personally reviewed lab studies and imaging.  EKG interpreted personally and results as stated above.  Imaging that was interpreted personally and results as stated above.    History of Present Illness:     Chief Complaint:    Chief Complaint   Patient presents with    Abdominal Pain     X1 day RLQ       Patient is a 34 y.o. female with a PMHx as above who presented to the ED with epigastric pain. Patient presents with one day of epigastric and right CVA pain associated with emesis. She reports her emesis was yellow in color and then had an episode of black emesis. She denies any diarrhea or sick

## 2025-01-24 NOTE — FLOWSHEET NOTE
4 Eyes Skin Assessment     NAME:  Courtney Capone  YOB: 1990  MEDICAL RECORD NUMBER:  [id number]    The patient is being assessed for  Admission    I agree that at least one RN has performed a thorough Head to Toe Skin Assessment on the patient. ALL assessment sites listed below have been assessed.      Areas assessed by both nurses:    Head, Face, Ears, Shoulders, Back, Chest, Arms, Elbows, Hands, Sacrum. Buttock, Coccyx, Ischium, Legs. Feet and Heels, and Under Medical Devices         Does the Patient have a Wound? No noted wound(s)       Anton Prevention initiated by RN: No  Wound Care Orders initiated by RN: No    Pressure Injury (Stage 3,4, Unstageable, DTI, NWPT, and Complex wounds) if present, place Wound referral order by RN under : No    New Ostomies, if present place, Ostomy referral order under : No     Nurse 1 eSignature: Electronically signed by Amanda Booker RN on 1/24/25 at 10:54 AM EST    **SHARE this note so that the co-signing nurse can place an eSignature**    Nurse 2 eSignature: Electronically signed by Deena Berry LPN on 1/24/25 at 11:04 AM EST

## 2025-01-24 NOTE — PROGRESS NOTES
Spiritual Health History and Assessment/Progress Note  [facility]    Loneliness/Social Isolation,  ,  ,      Name: Courtney Capone MRN: [id number]    Age: 34 y.o.     Sex: female   Language: Creole   Anabaptism: Anabaptist   Epigastric pain     Date: 1/24/2025            Total Time Calculated: 26 min              Spiritual Assessment began in [facility] OBSERVATION        Referral/Consult From: Rounding   Encounter Overview/Reason: Loneliness/Social Isolation  Service Provided For: Patient    Genna, Belief, Meaning:   Patient identifies as spiritual and has beliefs or practices that help with coping during difficult times  Family/Friends No family/friends present      Importance and Influence:  Patient has spiritual/personal beliefs that influence decisions regarding their health  Family/Friends No family/friends present    Community:  Patient feels well-supported. Support system includes: Spouse/Partner and Extended family  Family/Friends No family/friends present    Assessment and Plan of Care:     Patient Interventions include: Facilitated expression of thoughts and feelings. PT expressed that she does have family/friend support but is also feeling lonely. Requested that Chaplains continue to pray for her to feel better and for help with her loneliness as able. PT was informed how to reach out to Chaplains for further support.  Family/Friends Interventions include: No family/friends present    Patient Plan of Care: Spiritual Care available upon further referral  Family/Friends Plan of Care: No family/friends present    Electronically signed by Chaplain Alejandra on 1/24/2025 at 12:20 PM

## 2025-01-24 NOTE — ED NOTES
Returned from CT scan.   All monitoring equipment reapplied.     Continues to deny pain at this time.     NAD noted.   Skin w/d oral mucosa moist pink lips nailbeds pink brisk crf, resp easy unlabored with symmetrical rise and fall of chest wall.       Call light within reach, bed in low position,   Declines needs currently.

## 2025-01-24 NOTE — PROGRESS NOTES
Patient transferred to observation room 8. Per nurse in ED  services not available at this time. Vitals obtained, patient resting comfortably, able to ask nurse to turn light off and thanked nurse. Nursing supervisor locating  ipad to bring to the unit. Will re attempt contacting interpreting services.

## 2025-01-24 NOTE — ED PROVIDER NOTES
[facility] EMERGENCY DEPARTMENT  EMERGENCY DEPARTMENT ENCOUNTER      Pt Name: Courtney Capone  MRN: [id number]  Birthdate 1990  Date of evaluation: 1/23/2025  Provider: Breanna Greene MD    CHIEF COMPLAINT       Chief Complaint   Patient presents with    Abdominal Pain     X1 day RLQ         HISTORY OF PRESENT ILLNESS      Courtney Capone is a 34 y.o. female who presents to the emergency department  for   Chief Complaint   Patient presents with    Abdominal Pain     X1 day RLQ       34-year-old female presents complaining of epigastric abdominal pain as well as nausea and vomiting.  She is Romanian Creole speaking.   was used.  She seems have a history of partial gastrectomy and some potential partial bowel obstruction.  She reports that she had the surgery done in [facility] where she lived in 2020.  She has not had a scope recently.  She denies that she is currently following with a general surgeon or gastroenterologist.  She reports is on omeprazole at home.  She is also anticoagulated for history of blood clot.  She states she has had several days of the nausea and vomiting.  Does endorse some bowel function.  She denies any alleviating factors.  No chest pain.  No fevers.  No abdominal trauma or injury.  She has presents with some active emesis          Nursing Notes, Triage Notes & Vital Signs were reviewed.      REVIEW OF SYSTEMS    (2-9 systems for level 4, 10 or more for level 5)     Review of Systems   Gastrointestinal:  Positive for abdominal pain, nausea and vomiting.       Except as noted above the remainder of the review of systems was reviewed and negative.       PAST MEDICAL HISTORY   No past medical history on file.    Prior to Admission medications    Medication Sig Start Date End Date Taking? Authorizing Provider   ondansetron (ZOFRAN) 4 MG tablet Take 1 tablet by mouth daily as needed for Nausea or Vomiting 1/12/25   Andrés Araya PA-C   vitamin D

## 2025-01-25 ENCOUNTER — ANESTHESIA (OUTPATIENT)
Dept: ENDOSCOPY | Age: 35
End: 2025-01-25
Payer: COMMERCIAL

## 2025-01-25 ENCOUNTER — ANESTHESIA EVENT (OUTPATIENT)
Dept: ENDOSCOPY | Age: 35
End: 2025-01-25
Payer: COMMERCIAL

## 2025-01-25 ENCOUNTER — APPOINTMENT (OUTPATIENT)
Dept: ULTRASOUND IMAGING | Age: 35
DRG: 379 | End: 2025-01-25
Payer: COMMERCIAL

## 2025-01-25 PROBLEM — R11.2 NAUSEA AND VOMITING: Status: ACTIVE | Noted: 2025-01-25

## 2025-01-25 LAB
ALBUMIN SERPL-MCNC: 3.7 G/DL (ref 3.4–5)
ALBUMIN/GLOB SERPL: 1.7 {RATIO} (ref 1.1–2.2)
ALP SERPL-CCNC: 50 U/L (ref 40–129)
ALT SERPL-CCNC: 8 U/L (ref 10–40)
ANION GAP SERPL CALCULATED.3IONS-SCNC: 10 MMOL/L (ref 9–17)
AST SERPL-CCNC: 16 U/L (ref 15–37)
BASOPHILS # BLD: 0.05 K/UL
BASOPHILS NFR BLD: 2 % (ref 0–1)
BILIRUB SERPL-MCNC: 1.2 MG/DL (ref 0–1)
BUN SERPL-MCNC: 15 MG/DL (ref 7–20)
CALCIUM SERPL-MCNC: 9.3 MG/DL (ref 8.3–10.6)
CHLORIDE SERPL-SCNC: 106 MMOL/L (ref 99–110)
CO2 SERPL-SCNC: 26 MMOL/L (ref 21–32)
CREAT SERPL-MCNC: 0.8 MG/DL (ref 0.6–1.1)
EOSINOPHIL # BLD: 0.04 K/UL
EOSINOPHILS RELATIVE PERCENT: 1 % (ref 0–3)
GFR, ESTIMATED: 87 ML/MIN/1.73M2
GLUCOSE SERPL-MCNC: 79 MG/DL (ref 74–99)
HCT VFR BLD AUTO: 36.5 % (ref 37–47)
HGB BLD-MCNC: 10.5 G/DL (ref 12.5–16)
IMM GRANULOCYTES # BLD AUTO: 0.01 K/UL
IMM GRANULOCYTES NFR BLD: 0 %
LYMPHOCYTES NFR BLD: 1.65 K/UL
LYMPHOCYTES RELATIVE PERCENT: 55 % (ref 24–44)
MCH RBC QN AUTO: 23 PG (ref 27–31)
MCHC RBC AUTO-ENTMCNC: 28.8 G/DL (ref 32–36)
MCV RBC AUTO: 80 FL (ref 78–100)
MONOCYTES NFR BLD: 0.21 K/UL
MONOCYTES NFR BLD: 7 % (ref 0–4)
NEUTROPHILS NFR BLD: 35 % (ref 36–66)
NEUTS SEG NFR BLD: 1.05 K/UL
PLATELET ESTIMATE: ABNORMAL
PLATELET, FLUORESCENCE: 250 K/UL (ref 140–440)
POTASSIUM SERPL-SCNC: 4.1 MMOL/L (ref 3.5–5.1)
PROT SERPL-MCNC: 5.9 G/DL (ref 6.4–8.2)
RBC # BLD AUTO: 4.56 M/UL (ref 4.2–5.4)
RBC # BLD: ABNORMAL 10*6/UL
RBC # BLD: ABNORMAL 10*6/UL
SODIUM SERPL-SCNC: 142 MMOL/L (ref 136–145)
WBC # BLD: NORMAL 10*3/UL
WBC OTHER # BLD: 3 K/UL (ref 4–10.5)

## 2025-01-25 PROCEDURE — 43239 EGD BIOPSY SINGLE/MULTIPLE: CPT | Performed by: INTERNAL MEDICINE

## 2025-01-25 PROCEDURE — 94761 N-INVAS EAR/PLS OXIMETRY MLT: CPT

## 2025-01-25 PROCEDURE — 6360000002 HC RX W HCPCS: Performed by: NURSE ANESTHETIST, CERTIFIED REGISTERED

## 2025-01-25 PROCEDURE — 85025 COMPLETE CBC W/AUTO DIFF WBC: CPT

## 2025-01-25 PROCEDURE — 93970 EXTREMITY STUDY: CPT

## 2025-01-25 PROCEDURE — 96376 TX/PRO/DX INJ SAME DRUG ADON: CPT

## 2025-01-25 PROCEDURE — 36415 COLL VENOUS BLD VENIPUNCTURE: CPT

## 2025-01-25 PROCEDURE — 80053 COMPREHEN METABOLIC PANEL: CPT

## 2025-01-25 PROCEDURE — 88305 TISSUE EXAM BY PATHOLOGIST: CPT

## 2025-01-25 PROCEDURE — 0DB78ZX EXCISION OF STOMACH, PYLORUS, VIA NATURAL OR ARTIFICIAL OPENING ENDOSCOPIC, DIAGNOSTIC: ICD-10-PCS | Performed by: INTERNAL MEDICINE

## 2025-01-25 PROCEDURE — 3700000001 HC ADD 15 MINUTES (ANESTHESIA): Performed by: INTERNAL MEDICINE

## 2025-01-25 PROCEDURE — 3609012400 HC EGD TRANSORAL BIOPSY SINGLE/MULTIPLE: Performed by: INTERNAL MEDICINE

## 2025-01-25 PROCEDURE — G0378 HOSPITAL OBSERVATION PER HR: HCPCS

## 2025-01-25 PROCEDURE — 2709999900 HC NON-CHARGEABLE SUPPLY: Performed by: INTERNAL MEDICINE

## 2025-01-25 PROCEDURE — 6360000002 HC RX W HCPCS: Performed by: INTERNAL MEDICINE

## 2025-01-25 PROCEDURE — 3700000000 HC ANESTHESIA ATTENDED CARE: Performed by: INTERNAL MEDICINE

## 2025-01-25 PROCEDURE — 2500000003 HC RX 250 WO HCPCS: Performed by: STUDENT IN AN ORGANIZED HEALTH CARE EDUCATION/TRAINING PROGRAM

## 2025-01-25 PROCEDURE — 2580000003 HC RX 258: Performed by: INTERNAL MEDICINE

## 2025-01-25 PROCEDURE — 88342 IMHCHEM/IMCYTCHM 1ST ANTB: CPT

## 2025-01-25 PROCEDURE — 0DB98ZX EXCISION OF DUODENUM, VIA NATURAL OR ARTIFICIAL OPENING ENDOSCOPIC, DIAGNOSTIC: ICD-10-PCS | Performed by: INTERNAL MEDICINE

## 2025-01-25 RX ORDER — LIDOCAINE HYDROCHLORIDE 20 MG/ML
INJECTION, SOLUTION INTRAVENOUS
Status: DISCONTINUED | OUTPATIENT
Start: 2025-01-25 | End: 2025-01-25 | Stop reason: SDUPTHER

## 2025-01-25 RX ORDER — PROPOFOL 10 MG/ML
INJECTION, EMULSION INTRAVENOUS
Status: DISCONTINUED | OUTPATIENT
Start: 2025-01-25 | End: 2025-01-25 | Stop reason: SDUPTHER

## 2025-01-25 RX ADMIN — SODIUM CHLORIDE, PRESERVATIVE FREE 10 ML: 5 INJECTION INTRAVENOUS at 08:47

## 2025-01-25 RX ADMIN — PANTOPRAZOLE SODIUM 40 MG: 40 INJECTION, POWDER, FOR SOLUTION INTRAVENOUS at 08:47

## 2025-01-25 RX ADMIN — LIDOCAINE HYDROCHLORIDE 100 MG: 20 INJECTION, SOLUTION INTRAVENOUS at 10:18

## 2025-01-25 RX ADMIN — PROPOFOL 100 MG: 10 INJECTION, EMULSION INTRAVENOUS at 10:18

## 2025-01-25 RX ADMIN — PANTOPRAZOLE SODIUM 40 MG: 40 INJECTION, POWDER, FOR SOLUTION INTRAVENOUS at 21:30

## 2025-01-25 RX ADMIN — SODIUM CHLORIDE, PRESERVATIVE FREE 10 ML: 5 INJECTION INTRAVENOUS at 21:30

## 2025-01-25 NOTE — ANESTHESIA POSTPROCEDURE EVALUATION
Department of Anesthesiology  Postprocedure Note    Patient: Courtney Capone  MRN: [id number]  YOB: 1990  Date of evaluation: 1/25/2025    Procedure Summary       Date: 01/25/25 Room / Location: [place] [address] / [facility]    Anesthesia Start: 1012 Anesthesia Stop: 1030    Procedure: ESOPHAGOGASTRODUODENOSCOPY BIOPSY Diagnosis: Gastrointestinal hemorrhage, unspecified gastrointestinal hemorrhage type    Surgeons: Clare Leal MD Responsible Provider: Olivia Hale MD    Anesthesia Type: MAC ASA Status: 2            Anesthesia Type: No value filed.    Danii Phase I:      Danii Phase II:      Anesthesia Post Evaluation    Patient location during evaluation: bedside  Patient participation: complete - patient participated  Level of consciousness: awake  Airway patency: patent  Nausea & Vomiting: no nausea  Cardiovascular status: blood pressure returned to baseline  Respiratory status: acceptable  Hydration status: euvolemic  Pain management: adequate    No notable events documented.

## 2025-01-25 NOTE — ADDENDUM NOTE
Addendum  created 01/25/25 1148 by Olivia Hale MD    Clinical Note Signed, Review and Sign - Ready for Procedure

## 2025-01-25 NOTE — PROGRESS NOTES
V2.0  [facility] Hospitalist Progress Note      Name:  Courtney Capone /Age/Sex: 1990  (34 y.o. female)   MRN & CSN:  [id number] & [id number] Encounter Date/Time: 2025 3:18 PM EST    Location:  OBS [id number] PCP: No primary care provider on file.       Hospital Day: 3    Assessment and Plan:   Courtney Capone is a 34 y.o. female with pmh as noted below who presents with Epigastric pain      Plan:    Epigastric pain   - Pt presents with epigastric pain and N/V x1 day with an episode of dark emesis in ED.  -- No evidence of infection. CT abdomen shows Multiple mildly dilated loops of proximal small bowel with decompressed distal small bowel but discrete transition point is identified, partial obstruction could also be considered. AT this time patient with BM and passing gas, thus no concern for SBO. Given CT findings and episode of dark emesis will admit for obs.  -- Continue PPI, monitor for progression into SBO or possible UGIB at which will consult GI.   --Supportive care. If epigastric pain continues, consider testing for H.pylori   -- patient seen per Dr. Yusuf yesterday, had EGD today with .  Recommend follow-up in 2 weeks at GI clinic, and to continue Protonix.  No bleeding noted on EGD.  Patient tolerating regular diet, no emesis, abdominal pain.  --UA shows small leukocyte esterase, WBC 11.  Patient denies frequency urgency, or burning with urination.  Continue to monitor, WBC 3.0, will repeat in a.m. hemoglobin stable at 10.5        Hx of DVT  - On eliquis, holding currently, Will resume if ok with GI and Hem.   --hem consult place for recs.  Patient has a history of DVT, per chart review patient follows with Dr.J. Izquierdo apparently has not taken Eliquis for some time.  Consult placed for recommendations to resume Eliquis when able, will also get ultrasound of lower extremities     Diet ADULT DIET; Regular   DVT Prophylaxis [] Lovenox, []  Heparin, [] SCDs, [] Ambulation,  []

## 2025-01-25 NOTE — ANESTHESIA PRE PROCEDURE
Department of Anesthesiology  Preprocedure Note       Name:  Courtney Capone   Age:  34 y.o.  :  1990                                          MRN:  [id number]         Date:  2025      Surgeon: Surgeon(s):  Clare Leal MD    Procedure: Procedure(s):  ESOPHAGOGASTRODUODENOSCOPY    Medications prior to admission:   Prior to Admission medications    Medication Sig Start Date End Date Taking? Authorizing Provider   ondansetron (ZOFRAN) 4 MG tablet Take 1 tablet by mouth daily as needed for Nausea or Vomiting 25  Yes Andrés Araya PA-C   vitamin D (ERGOCALCIFEROL) 1.25 MG (28707 UT) CAPS capsule Take 1 capsule by mouth once a week 24  Yes Mica Izquierdo MD   apixaban (ELIQUIS) 5 MG TABS tablet Take 1 tablet by mouth 2 times daily 24 Yes Mica Izquierdo MD   OMEPRAZOLE PO Take by mouth   Yes Provider, MD Humaira   apixaban starter pack (ELIQUIS) 5 MG TBPK tablet Take 1 tablet by mouth See Admin Instructions 24  Yes Karla Parada PA-C       Current medications:    Current Facility-Administered Medications   Medication Dose Route Frequency Provider Last Rate Last Admin   • [Held by provider] apixaban (ELIQUIS) tablet 5 mg  5 mg Oral BID Alan Maxwell MD       • sodium chloride flush 0.9 % injection 5-40 mL  5-40 mL IntraVENous 2 times per day Alan Maxwell MD   10 mL at 25 0847   • sodium chloride flush 0.9 % injection 5-40 mL  5-40 mL IntraVENous PRN Alan Maxwell MD       • 0.9 % sodium chloride infusion   IntraVENous PRN Alan Maxwell MD       • potassium chloride (KLOR-CON M) extended release tablet 40 mEq  40 mEq Oral PRN Alan Maxwell MD        Or   • potassium bicarb-citric acid (EFFER-K) effervescent tablet 40 mEq  40 mEq Oral PRN Alan Maxwell MD        Or   • potassium chloride 10 mEq/100 mL IVPB (Peripheral Line)  10 mEq IntraVENous PRN Alan Maxwell MD       • potassium chloride 10 mEq/100 mL IVPB (Peripheral Line)  10 mEq IntraVENous PRN Hans,

## 2025-01-25 NOTE — BRIEF OP NOTE
Brief Postoperative Note      Patient: Courtney Capone  YOB: 1990  MRN: [id number]    Date of Procedure: 1/25/2025    Pre-Op Diagnosis Codes:      * Gastrointestinal hemorrhage, unspecified gastrointestinal hemorrhage type [K92.2]    Post-Op Diagnosis: Same       Procedure(s):  ESOPHAGOGASTRODUODENOSCOPY BIOPSY    Surgeon(s):  Clare Leal MD    Assistant:  * No surgical staff found *    Anesthesia: Monitor Anesthesia Care    Estimated Blood Loss (mL): Minimal    Complications: None    Specimens:   ID Type Source Tests Collected by Time Destination   A : BIOPSY NODULAR MUCOSA DUODENUM - FORCEPS Tissue Duodenum SURGICAL PATHOLOGY Clare Leal MD 1/25/2025 1024    B : BIOPSY ANTRUM AND BODY FOR GASTRITIS, H PYLORI - FORCEPS Tissue Stomach SURGICAL PATHOLOGY Clare Leal MD 1/25/2025 1025        Implants:  * No implants in log *      Drains: * No LDAs found *    Findings:  Infection Present At Time Of Surgery (PATOS) (choose all levels that have infection present):  No infection present    Impression:         -  LA Grade B esophagitis with no bleeding.          -  Gastritis, characterized by erythema and friability.  Biopsied.          - A gastrojejunostomy was found, characterized by healthy appearing             mucosa. The Pylorus was intact as well and the scope was advanced             both into the duodenum and the Jejunum.          -  Normal second portion of the duodenum.          -  Mucosal changes in the duodenum.  Biopsied.          -  Normal examined jejunum.     Recommendation:         -  Patient has a contact number available for emergencies.  The             signs and symptoms of potential delayed complications were discussed             with the patient.  Return to normal activities tomorrow.  Written             discharge instructions were provided to the patient.          -  Await pathology results.          -  Telephone GI clinic for pathology results in 1 week.          -  Use

## 2025-01-25 NOTE — ANESTHESIA PRE PROCEDURE
Department of Anesthesiology  Preprocedure Note       Name:  Courtney Capone   Age:  34 y.o.  :  1990                                          MRN:  [id number]         Date:  2025      Surgeon: Surgeon(s):  Clare Leal MD    Procedure: Procedure(s):  ESOPHAGOGASTRODUODENOSCOPY BIOPSY    Medications prior to admission:   Prior to Admission medications    Medication Sig Start Date End Date Taking? Authorizing Provider   ondansetron (ZOFRAN) 4 MG tablet Take 1 tablet by mouth daily as needed for Nausea or Vomiting 25  Yes Andrés Araya PA-C   vitamin D (ERGOCALCIFEROL) 1.25 MG (58514 UT) CAPS capsule Take 1 capsule by mouth once a week 24  Yes Mica Izquierdo MD   apixaban (ELIQUIS) 5 MG TABS tablet Take 1 tablet by mouth 2 times daily 24 Yes Mica Izquierdo MD   OMEPRAZOLE PO Take by mouth   Yes Provider, MD Humaira   apixaban starter pack (ELIQUIS) 5 MG TBPK tablet Take 1 tablet by mouth See Admin Instructions 24  Yes Karla Parada PA-C       Current medications:    Current Facility-Administered Medications   Medication Dose Route Frequency Provider Last Rate Last Admin   • [Held by provider] apixaban (ELIQUIS) tablet 5 mg  5 mg Oral BID Alan Maxwell MD       • sodium chloride flush 0.9 % injection 5-40 mL  5-40 mL IntraVENous 2 times per day Alan Maxwell MD   10 mL at 25 0847   • sodium chloride flush 0.9 % injection 5-40 mL  5-40 mL IntraVENous PRN Alan Maxwell MD       • 0.9 % sodium chloride infusion   IntraVENous PRN Alan Maxwell MD       • potassium chloride (KLOR-CON M) extended release tablet 40 mEq  40 mEq Oral PRN Alan Maxwell MD        Or   • potassium bicarb-citric acid (EFFER-K) effervescent tablet 40 mEq  40 mEq Oral PRN Alan Maxwell MD        Or   • potassium chloride 10 mEq/100 mL IVPB (Peripheral Line)  10 mEq IntraVENous PRN Alan Maxwell MD       • potassium chloride 10 mEq/100 mL IVPB (Peripheral Line)  10 mEq IntraVENous PRN

## 2025-01-25 NOTE — PLAN OF CARE
Problem: Discharge Planning  Goal: Discharge to home or other facility with appropriate resources  1/25/2025 0524 by Esme Mckoy, RN  Outcome: Progressing  1/25/2025 0524 by Esme Mckoy, RN  Outcome: Progressing     Problem: Safety - Adult  Goal: Free from fall injury  Outcome: Progressing     Problem: Pain  Goal: Verbalizes/displays adequate comfort level or baseline comfort level  Outcome: Progressing

## 2025-01-26 PROBLEM — K92.2 GI BLEED: Status: ACTIVE | Noted: 2025-01-26

## 2025-01-26 LAB
ALBUMIN SERPL-MCNC: 4 G/DL (ref 3.4–5)
ALBUMIN/GLOB SERPL: 1.5 {RATIO} (ref 1.1–2.2)
ALP SERPL-CCNC: 60 U/L (ref 40–129)
ALT SERPL-CCNC: 7 U/L (ref 10–40)
ANION GAP SERPL CALCULATED.3IONS-SCNC: 10 MMOL/L (ref 9–17)
AST SERPL-CCNC: 16 U/L (ref 15–37)
BASOPHILS # BLD: 0.04 K/UL
BASOPHILS NFR BLD: 1 % (ref 0–1)
BILIRUB SERPL-MCNC: 1.3 MG/DL (ref 0–1)
BUN SERPL-MCNC: 11 MG/DL (ref 7–20)
CALCIUM SERPL-MCNC: 9.5 MG/DL (ref 8.3–10.6)
CHLORIDE SERPL-SCNC: 103 MMOL/L (ref 99–110)
CO2 SERPL-SCNC: 24 MMOL/L (ref 21–32)
CREAT SERPL-MCNC: 0.8 MG/DL (ref 0.6–1.1)
EOSINOPHIL # BLD: 0.06 K/UL
EOSINOPHILS RELATIVE PERCENT: 1 % (ref 0–3)
GFR, ESTIMATED: 88 ML/MIN/1.73M2
GLUCOSE SERPL-MCNC: 82 MG/DL (ref 74–99)
HCT VFR BLD AUTO: 38.5 % (ref 37–47)
HGB BLD-MCNC: 10.9 G/DL (ref 12.5–16)
IMM GRANULOCYTES # BLD AUTO: 0.01 K/UL
IMM GRANULOCYTES NFR BLD: 0 %
LYMPHOCYTES NFR BLD: 2.01 K/UL
LYMPHOCYTES RELATIVE PERCENT: 38 % (ref 24–44)
MCH RBC QN AUTO: 23.2 PG (ref 27–31)
MCHC RBC AUTO-ENTMCNC: 28.3 G/DL (ref 32–36)
MCV RBC AUTO: 81.9 FL (ref 78–100)
MONOCYTES NFR BLD: 0.31 K/UL
MONOCYTES NFR BLD: 6 % (ref 0–4)
NEUTROPHILS NFR BLD: 55 % (ref 36–66)
NEUTS SEG NFR BLD: 2.92 K/UL
PLATELET, FLUORESCENCE: 252 K/UL (ref 140–440)
POTASSIUM SERPL-SCNC: 4.1 MMOL/L (ref 3.5–5.1)
PROT SERPL-MCNC: 6.7 G/DL (ref 6.4–8.2)
RBC # BLD AUTO: 4.7 M/UL (ref 4.2–5.4)
SODIUM SERPL-SCNC: 138 MMOL/L (ref 136–145)
WBC OTHER # BLD: 5.4 K/UL (ref 4–10.5)

## 2025-01-26 PROCEDURE — G0378 HOSPITAL OBSERVATION PER HR: HCPCS

## 2025-01-26 PROCEDURE — 99222 1ST HOSP IP/OBS MODERATE 55: CPT | Performed by: INTERNAL MEDICINE

## 2025-01-26 PROCEDURE — 6360000002 HC RX W HCPCS: Performed by: INTERNAL MEDICINE

## 2025-01-26 PROCEDURE — 94761 N-INVAS EAR/PLS OXIMETRY MLT: CPT

## 2025-01-26 PROCEDURE — 2580000003 HC RX 258: Performed by: NURSE PRACTITIONER

## 2025-01-26 PROCEDURE — 2580000003 HC RX 258: Performed by: INTERNAL MEDICINE

## 2025-01-26 PROCEDURE — 96376 TX/PRO/DX INJ SAME DRUG ADON: CPT

## 2025-01-26 PROCEDURE — 2500000003 HC RX 250 WO HCPCS: Performed by: STUDENT IN AN ORGANIZED HEALTH CARE EDUCATION/TRAINING PROGRAM

## 2025-01-26 PROCEDURE — 6370000000 HC RX 637 (ALT 250 FOR IP)

## 2025-01-26 PROCEDURE — 1200000000 HC SEMI PRIVATE

## 2025-01-26 RX ORDER — SODIUM CHLORIDE, SODIUM LACTATE, POTASSIUM CHLORIDE, AND CALCIUM CHLORIDE .6; .31; .03; .02 G/100ML; G/100ML; G/100ML; G/100ML
1000 INJECTION, SOLUTION INTRAVENOUS ONCE
Status: COMPLETED | OUTPATIENT
Start: 2025-01-26 | End: 2025-01-26

## 2025-01-26 RX ADMIN — PANTOPRAZOLE SODIUM 40 MG: 40 INJECTION, POWDER, FOR SOLUTION INTRAVENOUS at 09:39

## 2025-01-26 RX ADMIN — PANTOPRAZOLE SODIUM 40 MG: 40 INJECTION, POWDER, FOR SOLUTION INTRAVENOUS at 21:02

## 2025-01-26 RX ADMIN — APIXABAN 5 MG: 5 TABLET, FILM COATED ORAL at 20:11

## 2025-01-26 RX ADMIN — SODIUM CHLORIDE, PRESERVATIVE FREE 10 ML: 5 INJECTION INTRAVENOUS at 20:18

## 2025-01-26 RX ADMIN — SODIUM CHLORIDE, PRESERVATIVE FREE 10 ML: 5 INJECTION INTRAVENOUS at 09:39

## 2025-01-26 RX ADMIN — APIXABAN 5 MG: 5 TABLET, FILM COATED ORAL at 09:38

## 2025-01-26 RX ADMIN — SODIUM CHLORIDE, POTASSIUM CHLORIDE, SODIUM LACTATE AND CALCIUM CHLORIDE 1000 ML: 600; 310; 30; 20 INJECTION, SOLUTION INTRAVENOUS at 21:26

## 2025-01-26 NOTE — PLAN OF CARE
Problem: Discharge Planning  Goal: Discharge to home or other facility with appropriate resources  1/25/2025 2132 by Carole Alford RN  Outcome: Progressing  1/25/2025 1207 by Muriel Mcbride RN  Outcome: Progressing     Problem: Safety - Adult  Goal: Free from fall injury  1/25/2025 2132 by Carole Alford RN  Outcome: Progressing  1/25/2025 1207 by Muriel Mcbride RN  Outcome: Progressing     Problem: Pain  Goal: Verbalizes/displays adequate comfort level or baseline comfort level  1/25/2025 2132 by Carole Alford RN  Outcome: Progressing  1/25/2025 1207 by Muriel Mcbride RN  Outcome: Progressing

## 2025-01-26 NOTE — CARE COORDINATION
JAGDISH spoke with Bell ARAUZ about needs for discharge planning. Pt needs not for work showing her hospital stay and a return to work note.     DVT still present, pt needs to continue with Eliquis.    Dr Izquierdo to see pt and discuss POC for DVT and on going discharge treatment needs. FYI pt had stopped taking Eliquis due to cost, Pt had used the free month coupon, how ever pt needs to call [phone] for additional discounts or free samples. DILLONRN/JAGDISH

## 2025-01-26 NOTE — PROGRESS NOTES
I did NOT see the patient. The patient was discussed with the MAKI. I was available for questions and consultation as needed.     Venous Duplex shows chronic DVT. Awaiting hem/onc recs. Plan for dc tomorrow.

## 2025-01-26 NOTE — CONSULTS
ONCOLOGY HEMATOLOGY  CONSULTATION REPORT    2025  1:51 PM    Patient:    Courtney Capone  : 1990   34 y.o.             MRN: [id number]  Admitted: 2025  9:42 PM ATT: Juice Sparks MD   OBS /[id number]  AdmitDx: Abdominal pain, epigastric [R10.13]  Epigastric pain [R10.13]  Partial small bowel obstruction (HCC) [K56.600]  Nausea and vomiting, unspecified vomiting type [R11.2]  PCP: No primary care provider on file.    Reason for Consult: Left lower extremity DVT    Requesting Physician:  Juice Sparks MD      History Obtained From:  Patient and review of all records      CHIEF COMPLAINT    Chief Complaint   Patient presents with    Abdominal Pain     X1 day RLQ       HISTORY OF PRESENT ILLNESS   Courtney Capone is a 34 y.o. female who presents with epigastric abdominal pain.  Reported 1 episode of black emesis.  Denied any other overt bleeding.  Denied any diarrhea but reported intermittent chronic constipation poor appetite.  But denied any weight loss.  No fever.  Denied any chest pain, increased shortness of breath, cough.  Denied any lower extremity edema.    2025 CT scan of the abdomen pelvis:IMPRESSION:  1. Multiple mildly dilated loops of proximal small bowel with decompressed   distal small bowel but discrete transition point is identified. Degenerative   intravenous leads, however, partial obstruction could also be considered.  2. Prominent left para-aortic lymph nodes are probably reactive. Small amount of   free pelvic fluid could be physiologic or reactive.  3. Right renal cortical scarring.    IMPRESSION:   1. Chronic appearing nonocclusive DVTs at the right common femoral vein, the   left common femoral vein, the left greater saphenous vein at the saphenofemoral   junction, and the left mid femoral vein.     2. No acute DVT at the bilateral lower extremities.    Hemoglobin 10.9 rest of CBC withinLimits CMP within normal limits except for total bilirubin of 1.3    BACKGROUND  no LE edema bilaterally      LABORATORY RESULTS  CBC:   Recent Labs     01/24/25  1012 01/25/25  0836 01/26/25  0933   WBC 4.4 3.0* 5.4   HGB 10.3* 10.5* 10.9*     BMP:    Recent Labs     01/24/25  1012 01/25/25  0836 01/26/25  0933    142 138   K 3.5 4.1 4.1    106 103   CO2 26 26 24   BUN 20 15 11   CREATININE 0.8 0.8 0.8   GLUCOSE 79 79 82     Hepatic:   Recent Labs     01/24/25  1012 01/25/25  0836 01/26/25  0933   AST 15 16 16   ALT 12 8* 7*   BILITOT 1.0 1.2* 1.3*   ALKPHOS 53 50 60     INR:   Recent Labs     01/23/25  2157 01/24/25  1012   INR 0.9 1.1       RADIOLOGY REPORTS  CT scan of the chest  CT scan of the abdomen & pelvis  CT scan of the head  Bone scan    IMPRESSION & RECOMMENDATIONS:  Left lower extremity DVT, seems unprovoked.  Hypercoagulable workup was negative.  Recommend anticoagulation for 3 to 6 months.  No signs or symptoms of malignancy but CT scan of the abdomen pelvis with mildly enlarged lymph node.  Will follow-up on that.    Anemia, history of partial gastrectomy for peptic ulcer disease.  Rule out any nutritional deficiency or hemolysis.  No evidence of hemoglobinopathy.  Recommend replacement and transfusion support as needed    Epigastric pain, etiology not very clear.    Continue other medical care    This plan was discussed with the patient and  verbalized understanding.     We will continue to follow the patient.    Thank you for allowing us to participate in the care of this patient.     HUSSAIN

## 2025-01-26 NOTE — PROGRESS NOTES
V2.0  [facility] [place]      Name:  Courtney Capone /Age/Sex: 1990  (34 y.o. female)   MRN & CSN:  [id number] & [id number] Encounter Date/Time: 2025 12:58 PM EST    Location:  OBS  PCP: No primary care provider on file.       Hospital Day: 4    Assessment and Plan:   Courtney Capone is a 34 y.o. female with pmh as noted below who presents with Epigastric pain      Plan:    Epigastric pain   - Pt presents with epigastric pain and N/V x1 day with an episode of dark emesis in ED.  -- No evidence of infection. CT abdomen shows Multiple mildly dilated loops of proximal small bowel with decompressed distal small bowel but discrete transition point is identified, partial obstruction could also be considered. AT this time patient with BM and passing gas, thus no concern for SBO. Given CT findings and episode of dark emesis will admit for obs.  -- Continue PPI, monitor for progression into SBO or possible UGIB at which will consult GI.   --Supportive care. If epigastric pain continues, consider testing for H.pylori   -- patient seen per Dr. Yusuf yesterday, had EGD today with .  Recommend follow-up in 2 weeks at GI clinic, and to continue Protonix.  No bleeding noted on EGD.  Patient tolerating regular diet, no emesis, abdominal pain.  --UA shows small leukocyte esterase, WBC 11.  Patient denies frequency urgency, or burning with urination.  Continue to monitor, WBC 3.0, will repeat in a.m. hemoglobin stable at 10.5  -- Hgb stable, OK to resume Eliquis per Dr. Leal        Hx of DVT  - On eliquis, holding currently, Will resume if ok with GI and Hem.   --hem consult place for recs.  Patient has a history of DVT, per chart review patient follows with Dr.J. Izquierdo apparently has not taken Eliquis for some time.  Consult placed for recommendations to resume Eliquis when able, will also get ultrasound of lower extremities  -- ultrasound left lower extremity shows  Chronic  unspecified, without bleeding        - Z98.0, Intestinal bypass and anastomosis status        - K31.89, Other diseases of stomach and duodenum CPT(R) - 2023 copyright [employer]. All Rights Reserved.       The CPT codes, CCI edits and ICD codes generated are intended as       suggestions and were generated based on input data.  These codes are       preliminary and upon  review may be revised to meet current       compliance and payer requirements.  The provider is responsible for       the final determination of appropriate codes, and modifiers. Scope Withdrawal Time:       00:03:52 BONNIE AHN This document has been electronically signed. Note Initiated:1/25/2025 Note Completed:1/25/2025 10:32 AM    CT ABDOMEN PELVIS W IV CONTRAST Additional Contrast? None    Result Date: 1/24/2025  PROCEDURE: CT ABDOMEN PELVIS W IV CONTRAST DATE OF EXAM:  1/24/2025 0:48 INDICATION: epigastric abd pain; concern for intraabdomianl process Contrast utilized and the relevant clinical information: IOPAMIDOL 76 % IV SOLN COMPARISON: CT abdomen and pelvis 1/12/2025 TECHNIQUE: Axial CT imaging of the abdomen and pelvis was performed following the administration of intravenous contrast. Coronal reformations were evaluated.  DOSE OPTIMIZATION: CT radiation dose optimization techniques (automated exposure  control, and use of iterative reconstruction techniques, or adjustment of the mA and/or kV according to patient size) were used to limit patient radiation dose. FINDINGS: No consolidation in the imaged lung. The liver, gallbladder, pancreas and spleen are normal. Adrenal glands are normal. Multifocal right renal cortical scarring. Right upper pole 1.0 cm cyst measures slightly greater than simple fluid attenuation but this is probably related to volume averaging in a simple cyst. Subcentimeter hypodensity in the interpolar left kidney is too small to characterize but likely a benign cyst. No  hydronephrosis. No

## 2025-01-27 VITALS
BODY MASS INDEX: 19.48 KG/M2 | TEMPERATURE: 98 F | OXYGEN SATURATION: 100 % | DIASTOLIC BLOOD PRESSURE: 66 MMHG | SYSTOLIC BLOOD PRESSURE: 101 MMHG | HEIGHT: 60 IN | HEART RATE: 60 BPM | WEIGHT: 99.21 LBS | RESPIRATION RATE: 19 BRPM

## 2025-01-27 LAB
ALBUMIN SERPL-MCNC: 3.8 G/DL (ref 3.4–5)
ALBUMIN/GLOB SERPL: 1.4 {RATIO} (ref 1.1–2.2)
ALP SERPL-CCNC: 57 U/L (ref 40–129)
ALT SERPL-CCNC: 8 U/L (ref 10–40)
ANION GAP SERPL CALCULATED.3IONS-SCNC: 11 MMOL/L (ref 9–17)
AST SERPL-CCNC: 19 U/L (ref 15–37)
BASOPHILS # BLD: 0.04 K/UL
BASOPHILS # BLD: 0.05 K/UL
BASOPHILS NFR BLD: 1 % (ref 0–1)
BASOPHILS NFR BLD: 2 % (ref 0–1)
BILIRUB SERPL-MCNC: 0.4 MG/DL (ref 0–1)
BUN SERPL-MCNC: 13 MG/DL (ref 7–20)
CALCIUM SERPL-MCNC: 9 MG/DL (ref 8.3–10.6)
CHLORIDE SERPL-SCNC: 107 MMOL/L (ref 99–110)
CO2 SERPL-SCNC: 22 MMOL/L (ref 21–32)
CREAT SERPL-MCNC: 0.7 MG/DL (ref 0.6–1.1)
EOSINOPHIL # BLD: 0.05 K/UL
EOSINOPHIL # BLD: 0.06 K/UL
EOSINOPHILS RELATIVE PERCENT: 1 % (ref 0–3)
EOSINOPHILS RELATIVE PERCENT: 2 % (ref 0–3)
FERRITIN SERPL-MCNC: 202 NG/ML (ref 15–150)
GFR, ESTIMATED: >90 ML/MIN/1.73M2
GLUCOSE SERPL-MCNC: 96 MG/DL (ref 74–99)
HAPTOGLOB SERPL-MCNC: 96 MG/DL (ref 30–200)
HCT VFR BLD AUTO: 36.7 % (ref 37–47)
HCT VFR BLD AUTO: 37 % (ref 37–47)
HGB BLD-MCNC: 10.7 G/DL (ref 12.5–16)
HGB BLD-MCNC: 10.8 G/DL (ref 12.5–16)
HIV 1+2 AB+HIV1 P24 AG SERPL QL IA: NONREACTIVE
IMM GRANULOCYTES # BLD AUTO: 0.01 K/UL
IMM GRANULOCYTES # BLD AUTO: 0.01 K/UL
IMM GRANULOCYTES NFR BLD: 0 %
IMM GRANULOCYTES NFR BLD: 0 %
LDH SERPL-CCNC: 129 U/L (ref 100–190)
LYMPHOCYTES NFR BLD: 1.26 K/UL
LYMPHOCYTES NFR BLD: 1.64 K/UL
LYMPHOCYTES RELATIVE PERCENT: 44 % (ref 24–44)
LYMPHOCYTES RELATIVE PERCENT: 48 % (ref 24–44)
MCH RBC QN AUTO: 23.3 PG (ref 27–31)
MCH RBC QN AUTO: 23.3 PG (ref 27–31)
MCHC RBC AUTO-ENTMCNC: 29.2 G/DL (ref 32–36)
MCHC RBC AUTO-ENTMCNC: 29.2 G/DL (ref 32–36)
MCV RBC AUTO: 79.9 FL (ref 78–100)
MCV RBC AUTO: 80 FL (ref 78–100)
MONOCYTES NFR BLD: 0.19 K/UL
MONOCYTES NFR BLD: 0.3 K/UL
MONOCYTES NFR BLD: 7 % (ref 0–4)
MONOCYTES NFR BLD: 8 % (ref 0–4)
NEUTROPHILS NFR BLD: 41 % (ref 36–66)
NEUTROPHILS NFR BLD: 45 % (ref 36–66)
NEUTS SEG NFR BLD: 1.08 K/UL
NEUTS SEG NFR BLD: 1.69 K/UL
PLATELET CONFIRMATION: NORMAL
PLATELET, FLUORESCENCE: 208 K/UL (ref 140–440)
PLATELET, FLUORESCENCE: 247 K/UL (ref 140–440)
POTASSIUM SERPL-SCNC: 4.5 MMOL/L (ref 3.5–5.1)
PROT SERPL-MCNC: 6.4 G/DL (ref 6.4–8.2)
RBC # BLD AUTO: 4.59 M/UL (ref 4.2–5.4)
RBC # BLD AUTO: 4.63 M/UL (ref 4.2–5.4)
RETICS # AUTO: 0.07 M/UL
RETICS/RBC NFR AUTO: 1.5 % (ref 0.2–2)
SODIUM SERPL-SCNC: 139 MMOL/L (ref 136–145)
WBC OTHER # BLD: 2.6 K/UL (ref 4–10.5)
WBC OTHER # BLD: 3.7 K/UL (ref 4–10.5)

## 2025-01-27 PROCEDURE — 6370000000 HC RX 637 (ALT 250 FOR IP): Performed by: NURSE PRACTITIONER

## 2025-01-27 PROCEDURE — 87389 HIV-1 AG W/HIV-1&-2 AB AG IA: CPT

## 2025-01-27 PROCEDURE — 94761 N-INVAS EAR/PLS OXIMETRY MLT: CPT

## 2025-01-27 PROCEDURE — 85045 AUTOMATED RETICULOCYTE COUNT: CPT

## 2025-01-27 PROCEDURE — 2580000003 HC RX 258: Performed by: INTERNAL MEDICINE

## 2025-01-27 PROCEDURE — 83010 ASSAY OF HAPTOGLOBIN QUANT: CPT

## 2025-01-27 PROCEDURE — 6360000002 HC RX W HCPCS: Performed by: INTERNAL MEDICINE

## 2025-01-27 PROCEDURE — 36415 COLL VENOUS BLD VENIPUNCTURE: CPT

## 2025-01-27 PROCEDURE — 83615 LACTATE (LD) (LDH) ENZYME: CPT

## 2025-01-27 PROCEDURE — 99231 SBSQ HOSP IP/OBS SF/LOW 25: CPT | Performed by: PHYSICIAN ASSISTANT

## 2025-01-27 PROCEDURE — 6370000000 HC RX 637 (ALT 250 FOR IP)

## 2025-01-27 PROCEDURE — 2500000003 HC RX 250 WO HCPCS: Performed by: STUDENT IN AN ORGANIZED HEALTH CARE EDUCATION/TRAINING PROGRAM

## 2025-01-27 PROCEDURE — 80053 COMPREHEN METABOLIC PANEL: CPT

## 2025-01-27 PROCEDURE — 82728 ASSAY OF FERRITIN: CPT

## 2025-01-27 PROCEDURE — 85025 COMPLETE CBC W/AUTO DIFF WBC: CPT

## 2025-01-27 RX ORDER — PANTOPRAZOLE SODIUM 40 MG/1
40 TABLET, DELAYED RELEASE ORAL
Qty: 90 TABLET | Refills: 0 | Status: SHIPPED | OUTPATIENT
Start: 2025-01-27

## 2025-01-27 RX ORDER — MIDODRINE HYDROCHLORIDE 5 MG/1
5 TABLET ORAL
Status: COMPLETED | OUTPATIENT
Start: 2025-01-27 | End: 2025-01-27

## 2025-01-27 RX ORDER — MIDODRINE HYDROCHLORIDE 5 MG/1
5 TABLET ORAL
Status: DISCONTINUED | OUTPATIENT
Start: 2025-01-27 | End: 2025-01-27 | Stop reason: HOSPADM

## 2025-01-27 RX ORDER — MIDODRINE HYDROCHLORIDE 5 MG/1
5 TABLET ORAL 3 TIMES DAILY
Qty: 90 TABLET | Refills: 0 | Status: SHIPPED | OUTPATIENT
Start: 2025-01-27 | End: 2025-01-27

## 2025-01-27 RX ORDER — MIDODRINE HYDROCHLORIDE 5 MG/1
5 TABLET ORAL 3 TIMES DAILY
Qty: 90 TABLET | Refills: 0 | Status: SHIPPED | OUTPATIENT
Start: 2025-01-27

## 2025-01-27 RX ORDER — MIDODRINE HYDROCHLORIDE 5 MG/1
5 TABLET ORAL 2 TIMES DAILY
Qty: 60 TABLET | Refills: 0 | Status: SHIPPED | OUTPATIENT
Start: 2025-01-27 | End: 2025-01-27

## 2025-01-27 RX ORDER — MIDODRINE HYDROCHLORIDE 5 MG/1
5 TABLET ORAL 2 TIMES DAILY
Qty: 90 TABLET | Refills: 0 | Status: SHIPPED | OUTPATIENT
Start: 2025-01-27 | End: 2025-01-27

## 2025-01-27 RX ADMIN — MIDODRINE HYDROCHLORIDE 5 MG: 5 TABLET ORAL at 11:18

## 2025-01-27 RX ADMIN — PANTOPRAZOLE SODIUM 40 MG: 40 INJECTION, POWDER, FOR SOLUTION INTRAVENOUS at 09:25

## 2025-01-27 RX ADMIN — MIDODRINE HYDROCHLORIDE 5 MG: 5 TABLET ORAL at 02:50

## 2025-01-27 RX ADMIN — SODIUM CHLORIDE, PRESERVATIVE FREE 5 ML: 5 INJECTION INTRAVENOUS at 09:26

## 2025-01-27 RX ADMIN — APIXABAN 5 MG: 5 TABLET, FILM COATED ORAL at 09:26

## 2025-01-27 RX ADMIN — MIDODRINE HYDROCHLORIDE 5 MG: 5 TABLET ORAL at 09:26

## 2025-01-27 NOTE — PROGRESS NOTES
Outpatient Pharmacy Progress Note for Meds-to-Beds    Total number of Prescriptions Filled: 3    Additional Documentation:  Medications picked-up in the OP Pharmacy by nurse Ny to provide to patient      Thank you for letting us serve your patients.  [address] [address]    Phone: [phone]    Fax: [phone]

## 2025-01-27 NOTE — PLAN OF CARE
Problem: Discharge Planning  Goal: Discharge to home or other facility with appropriate resources  1/27/2025 1227 by Zainab Moran RN  Outcome: Progressing  1/26/2025 2237 by Jeanine Dale, RN  Outcome: Progressing     Problem: Safety - Adult  Goal: Free from fall injury  1/27/2025 1227 by Zainab Moran RN  Outcome: Progressing  1/26/2025 2237 by Jeanine Dale, RN  Outcome: Progressing     Problem: Pain  Goal: Verbalizes/displays adequate comfort level or baseline comfort level  1/27/2025 1227 by Zainab Moran RN  Outcome: Progressing  1/26/2025 2237 by Jeanine Dale, RN  Outcome: Progressing

## 2025-01-27 NOTE — PROGRESS NOTES
I have seen and examined this patient personally, and independently of Meghan Garcia PA-C.  The plan was developed mutually at the time of the visit with the patient. Meghan and/or I have spoken with patient/family, nursing staff and provided written and verbal instructions. The above note has been reviewed and I agree with the Assessment,  Diagnosis, and Treatment plan as suggested by Meghan Garcia. I have also suggested more changes to the therapy plan , which is being implemented.    - Doing well. Eating. No more throwing up. S/p EGD    - Exam stable. NT      A/P:  JAZMIN  UGI bleed - 2/2 esophagitis vs tear  H/o DVT on Eliquis     Plan:  - PPI Daily  - Ok for diet  - Ok to d/c from GI. Will s/o      [facility]  Progress Note  2025  11:10 AM  ___________________________________________________________________________  Patient:    Courtney Capone  : 1990   34 y.o.             MRN: [id number]  Admitted: 2025  9:42 PM ATT: Juice Sparks MD   OBS [id number]  AdmitDx: Abdominal pain, epigastric [R10.13]  Epigastric pain [R10.13]  Partial small bowel obstruction (HCC) [K56.600]  Nausea and vomiting, unspecified vomiting type [R11.2]  GI bleed [K92.2]  PCP: No primary care provider on file.  ___________________________________________________________________________  ASSESSMENT AND PLAN :    Impression:   Coffee ground emesis  JAZMIN - labs in   H/o DVT on Eliquis    Discussion:   34 y.o. female with h/o PUD w/ ??surgery in [facility], also surgery for ?bowel obstruction who presented to hospital w/ nausea, vomiting and coffee ground emesis. GI consulted for UGI bleed.      She says that Wednesday she started having acute onset of nausea + vomiting w/o any precipitating event. She also noticed dark blood in it. She denies melena. Last BM yesterday which was yellow. She also c/o upper epigastric abd pain that is sharp w/o radiation.  Patient did tell me that she had  of proximal small bowel with decompressed   distal small bowel but discrete transition point is identified. Degenerative   intravenous leads, however, partial obstruction could also be considered.   2. Prominent left para-aortic lymph nodes are probably reactive. Small amount of    free pelvic fluid could be physiologic or reactive.   3. Right renal cortical scarring.   ___________________________________________________________________________    OBJECTIVE:    Exam:  BP (!) 89/56   Pulse 60   Temp 98 °F (36.7 °C) (Oral)   Resp 19   Ht 1.524 m (5')   Wt 45 kg (99 lb 3.3 oz)   SpO2 100%   BMI 19.38 kg/m²     GEN:     NAD, conversant, resting comfortably in bed.    HENT:     Atraumatic. Normocephalic. Hearing intact.    RESP:     No wheezing. Normal respiratory effort.    CV:     Pulse palpable, S1S2. RRR.    GI:     Soft, NT. ND. No rebound/guarding. No palpable organomegaly   Ext:     No peripheral edema or extremity lymphadenopathy   Msk:     No cyanosis, FAROM. Strength appears intact.    Psych:     Appropriate, cooperative. Alert, oriented x3.    Skin:     No rash, no lesions. No jaundice. Normal temperature.    ___________________________________________________________________________    CBC:   Recent Labs     01/25/25  0836 01/26/25  0933   WBC 3.0* 5.4   HGB 10.5* 10.9*       BMP:    Recent Labs     01/25/25  0836 01/26/25  0933    138   K 4.1 4.1    103   CO2 26 24   BUN 15 11   CREATININE 0.8 0.8   GLUCOSE 79 82       Magnesium:   Lab Results   Component Value Date/Time    MG 1.8 01/24/2025 10:12 AM       Hepatic:   Recent Labs     01/25/25  0836 01/26/25  0933   AST 16 16   ALT 8* 7*   BILITOT 1.2* 1.3*   ALKPHOS 50 60       INR: No results for input(s): \"INR\" in the last 72 hours.      Intake/Output Summary (Last 24 hours) at 1/27/2025 1110  Last data filed at 1/26/2025 2018  Gross per 24 hour   Intake 10 ml   Output --   Net 10 ml

## 2025-01-27 NOTE — CARE COORDINATION
This RN/CM spoke with Brooke GROSS states she has sent pt Rx to o/p phamacy.  The CM picked up pt medications pt has 2 refills of Eliquis.  RN will go over medications and explain about the refills on the Eliquis, and the importance of o/p follow up with  with oncology.  POC is for discharge home today. MARI CARRANZA/CM

## 2025-01-27 NOTE — PROGRESS NOTES
Discharge paperwork explained to pt via . The importance of refilling their eliquis and following up with hem/onc outpatient was discussed and pt expressed understanding. All questions answered. A return to work note was provided to the pt as well. PIV removed at this time. LG

## 2025-01-27 NOTE — DISCHARGE SUMMARY
Completed:1/25/2025 10:32 AM    CT ABDOMEN PELVIS W IV CONTRAST Additional Contrast? None    Result Date: 1/24/2025  PROCEDURE: CT ABDOMEN PELVIS W IV CONTRAST DATE OF EXAM:  1/24/2025 0:48 INDICATION: epigastric abd pain; concern for intraabdomianl process Contrast utilized and the relevant clinical information: IOPAMIDOL 76 % IV SOLN COMPARISON: CT abdomen and pelvis 1/12/2025 TECHNIQUE: Axial CT imaging of the abdomen and pelvis was performed following the administration of intravenous contrast. Coronal reformations were evaluated.  DOSE OPTIMIZATION: CT radiation dose optimization techniques (automated exposure  control, and use of iterative reconstruction techniques, or adjustment of the mA and/or kV according to patient size) were used to limit patient radiation dose. FINDINGS: No consolidation in the imaged lung. The liver, gallbladder, pancreas and spleen are normal. Adrenal glands are normal. Multifocal right renal cortical scarring. Right upper pole 1.0 cm cyst measures slightly greater than simple fluid attenuation but this is probably related to volume averaging in a simple cyst. Subcentimeter hypodensity in the interpolar left kidney is too small to characterize but likely a benign cyst. No  hydronephrosis. No bladder wall thickening. Uterus and adnexa are unremarkable. Stomach is unremarkable. Small bowel anastomotic sutures are present. Mild distention of loops of small bowel in the left mid upper abdomen with decompressed bowel distally but a discrete transition point is not identified. No colonic wall thickening. No intraperitoneal free air. Small amount of free pelvic fluid. Prominent left para-aortic lymph nodes are probably reactive measuring up to 0.8 x 1.4 cm. Aorta is normal in caliber. No acute osseous findings. IMPRESSION: 1. Multiple mildly dilated loops of proximal small bowel with decompressed distal small bowel but discrete transition point is identified. Degenerative intravenous leads,  however, partial obstruction could also be considered. 2. Prominent left para-aortic lymph nodes are probably reactive. Small amount of  free pelvic fluid could be physiologic or reactive. 3. Right renal cortical scarring.  Dictated and Electronically Signed By: Gurjit Chaudhary 1/24/2025 0:45          CBC:   Recent Labs     01/26/25  0933 01/27/25  0758 01/27/25  1319   WBC 5.4 2.6* 3.7*   HGB 10.9* 10.7* 10.8*     BMP:    Recent Labs     01/25/25  0836 01/26/25  0933 01/27/25  1319    138 139   K 4.1 4.1 4.5    103 107   CO2 26 24 22   BUN 15 11 13   CREATININE 0.8 0.8 0.7   GLUCOSE 79 82 96     Hepatic:   Recent Labs     01/25/25  0836 01/26/25  0933 01/27/25  1319   AST 16 16 19   ALT 8* 7* 8*   BILITOT 1.2* 1.3* 0.4   ALKPHOS 50 60 57     Lipids: No results found for: \"CHOL\", \"HDL\", \"TRIG\"  Hemoglobin A1C: No results found for: \"LABA1C\"  TSH: No results found for: \"TSH\"  Troponin: No results found for: \"TROPONINT\"  Lactic Acid: No results for input(s): \"LACTA\" in the last 72 hours.  BNP: No results for input(s): \"PROBNP\" in the last 72 hours.  UA:  Lab Results   Component Value Date/Time    NITRU NEGATIVE 01/24/2025 11:05 PM    COLORU Yellow 01/24/2025 11:05 PM    PHUR 8.0 01/24/2025 11:05 PM    WBCUA 11 01/24/2025 11:05 PM    RBCUA 0 01/24/2025 11:05 PM    MUCUS RARE 01/24/2025 11:05 PM    BACTERIA MODERATE 01/12/2025 02:15 PM    LEUKOCYTESUR SMALL 01/24/2025 11:05 PM    UROBILINOGEN 1.0 01/24/2025 11:05 PM    BILIRUBINUR NEGATIVE 01/24/2025 11:05 PM    GLUCOSEU NEGATIVE 01/24/2025 11:05 PM    KETUA NEGATIVE 01/24/2025 11:05 PM     Urine Cultures: No results found for: \"LABURIN\"  Blood Cultures: No results found for: \"BC\"  No results found for: \"BLOODCULT2\"  Organism: No results found for: \"ORG\"    Time Spent Discharging patient 35 minutes    Electronically signed by Brooke Lee PA-C on 1/27/2025 at 2:44 PM

## 2025-01-27 NOTE — PLAN OF CARE
Problem: Discharge Planning  Goal: Discharge to home or other facility with appropriate resources  1/26/2025 2237 by Jeanine Dale RN  Outcome: Progressing  1/26/2025 1020 by Muriel Mcbride RN  Outcome: Progressing     Problem: Safety - Adult  Goal: Free from fall injury  1/26/2025 2237 by Jeanine Dale RN  Outcome: Progressing  1/26/2025 1020 by Muriel Mcbride RN  Outcome: Progressing     Problem: Pain  Goal: Verbalizes/displays adequate comfort level or baseline comfort level  1/26/2025 2237 by Jeanine Dale RN  Outcome: Progressing  1/26/2025 1020 by Muriel Mcbride RN  Outcome: Progressing

## 2025-01-27 NOTE — PROGRESS NOTES
Patient/caregivers speak [place] Island[place] Creole as their preferred language for their healthcare communication. For safe communication, use the [employer]  carts or call:      Jacob Hair Senior -Navigator [phone]    General phone: [phone] ( [phone]) Email: [email or web address]      Please always document the use of interpreting services ('s ID number) in your clinical notes.      Our interpreters are available for team members working with limited English proficient (LEP) patients remotely, via phone or video or in person (if needed for special cases).      When using family members to interpret, for the safety of the patient and protection of the communication of both our patient and [facility] staff the VRI or telephonic  should remain on the line to monitor that all communication is accurate and complete. The  should be instructed to notify [facility] staff immediately if there are any inaccuracies.    Thank you,      Stanley Mclain  Senior /Navigator  Language Services Department  [phone]

## 2025-01-27 NOTE — PROGRESS NOTES
ONCOLOGY HEMATOLOGY  Progress Note    2025  1:04 PM    Patient:    Courtney Capone  : 1990   34 y.o.             MRN: [id number]  Admitted: 2025  9:42 PM ATT: Juice Sparks MD   OBS /[id number]  AdmitDx: Abdominal pain, epigastric [R10.13]  Epigastric pain [R10.13]  Partial small bowel obstruction (HCC) [K56.600]  Nausea and vomiting, unspecified vomiting type [R11.2]  GI bleed [K92.2]  PCP: No primary care provider on file.    Reason for Consult: Left lower extremity DVT    Requesting Physician:  Juice Sparks MD      History Obtained From:  Patient and review of all records      CHIEF COMPLAINT    Chief Complaint   Patient presents with    Abdominal Pain     X1 day RLQ       HISTORY OF PRESENT ILLNESS   Courtney Capone is a 34 y.o. female who presents with epigastric abdominal pain.  Reported 1 episode of black emesis.  Denied any other overt bleeding.  Denied any diarrhea but reported intermittent chronic constipation poor appetite.  But denied any weight loss.  No fever.  Denied any chest pain, increased shortness of breath, cough.  Denied any lower extremity edema. Patient was taking omeprazole daily.      2025 CT scan of the abdomen pelvis:IMPRESSION:  1. Multiple mildly dilated loops of proximal small bowel with decompressed   distal small bowel but discrete transition point is identified. Degenerative   intravenous leads, however, partial obstruction could also be considered.  2. Prominent left para-aortic lymph nodes are probably reactive. Small amount of   free pelvic fluid could be physiologic or reactive.  3. Right renal cortical scarring.    IMPRESSION:   1. Chronic appearing nonocclusive DVTs at the right common femoral vein, the   left common femoral vein, the left greater saphenous vein at the saphenofemoral   junction, and the left mid femoral vein.     2. No acute DVT at the bilateral lower extremities.    Hemoglobin 10.9 rest of CBC withinLimits CMP within normal limits

## 2025-01-28 ENCOUNTER — CARE COORDINATION (OUTPATIENT)
Dept: CASE MANAGEMENT | Age: 35
End: 2025-01-28

## 2025-01-28 PROBLEM — Z86.718 HX OF DEEP VENOUS THROMBOSIS: Status: ACTIVE | Noted: 2025-01-28

## 2025-01-28 LAB — SURGICAL PATHOLOGY REPORT: NORMAL

## 2025-01-28 NOTE — CARE COORDINATION
Care Transitions Note    Initial Call - Call within 2 business days of discharge: Yes    Patient Current Location:  Home: [address] [place] [address] language Services , [place] [id number]    Care Transition Nurse contacted the patient by telephone to perform post hospital discharge assessment, verified name and  as identifiers. Provided introduction to self, and explanation of the Care Transition Nurse role.     Patient: Courtney Capone    Patient : 1990   MRN: [id number]    Reason for Admission: DX:epigastric pain, DVT hx  [facility]:-25  [facility] language services [phone]   Discharge Date: 25  RURS: Readmission Risk Score: 5.7      Last Discharge Facility       Date Complaint Diagnosis Description Type Department Provider    25 Abdominal Pain Nausea and vomiting, unspecified vomiting type ... ED [facility] Juice Sparks MD; Tony Greene, ...            Was this an external facility discharge? No    Additional needs identified to be addressed with provider   No needs identified             Method of communication with provider: none.    Patients top risk factors for readmission: medical condition-see above    Interventions to address risk factors:   Review of patient management of conditions/medications: see below    Care Summary Note: Spoke to Pt via  [employer] [id number]. Pt lives in private residence with  and child. Uses no AD and is not an active . Pt denies abd pain, N/V/D . Reports appetite is good had porridge this and and was making a sandwich during call. Pt reports she is well hydrated. Pt educated on S/S of dehydration. Pt VU.  Denies hematemesis or blood in stool. Is passing gas. Pt reports BLE DVT : swelling has resolved, no tenderness and color is \"back to normal\" Pt instructed to call MD/urgent care or walk in clinic if symptoms return.Meds reviewed Pt states she now has Eliquis in the home

## 2025-01-31 ENCOUNTER — HOSPITAL ENCOUNTER (OUTPATIENT)
Dept: INFUSION THERAPY | Age: 35
Discharge: HOME | End: 2025-01-31
Payer: COMMERCIAL

## 2025-01-31 DIAGNOSIS — E53.9 VITAMIN B DEFICIENCY: Primary | ICD-10-CM

## 2025-01-31 PROCEDURE — 6360000002 HC RX W HCPCS: Performed by: INTERNAL MEDICINE

## 2025-01-31 PROCEDURE — 96372 THER/PROPH/DIAG INJ SC/IM: CPT

## 2025-01-31 RX ORDER — ACETAMINOPHEN 325 MG/1
650 TABLET ORAL
OUTPATIENT
Start: 2025-02-28

## 2025-01-31 RX ORDER — SODIUM CHLORIDE 9 MG/ML
INJECTION, SOLUTION INTRAVENOUS CONTINUOUS
OUTPATIENT
Start: 2025-02-28

## 2025-01-31 RX ORDER — CYANOCOBALAMIN 1000 UG/ML
1000 INJECTION, SOLUTION INTRAMUSCULAR; SUBCUTANEOUS ONCE
OUTPATIENT
Start: 2025-02-28 | End: 2025-02-28

## 2025-01-31 RX ORDER — ALBUTEROL SULFATE 90 UG/1
4 INHALANT RESPIRATORY (INHALATION) PRN
OUTPATIENT
Start: 2025-02-28

## 2025-01-31 RX ORDER — EPINEPHRINE 1 MG/ML
0.3 INJECTION, SOLUTION, CONCENTRATE INTRAVENOUS PRN
OUTPATIENT
Start: 2025-02-28

## 2025-01-31 RX ORDER — CYANOCOBALAMIN 1000 UG/ML
1000 INJECTION, SOLUTION INTRAMUSCULAR; SUBCUTANEOUS ONCE
Status: COMPLETED | OUTPATIENT
Start: 2025-01-31 | End: 2025-01-31

## 2025-01-31 RX ORDER — DIPHENHYDRAMINE HYDROCHLORIDE 50 MG/ML
50 INJECTION INTRAMUSCULAR; INTRAVENOUS
OUTPATIENT
Start: 2025-02-28

## 2025-01-31 RX ORDER — ONDANSETRON 2 MG/ML
8 INJECTION INTRAMUSCULAR; INTRAVENOUS
OUTPATIENT
Start: 2025-02-28

## 2025-01-31 RX ORDER — HYDROCORTISONE SODIUM SUCCINATE 100 MG/2ML
100 INJECTION INTRAMUSCULAR; INTRAVENOUS
OUTPATIENT
Start: 2025-02-28

## 2025-01-31 RX ORDER — FAMOTIDINE 10 MG/ML
20 INJECTION, SOLUTION INTRAVENOUS
OUTPATIENT
Start: 2025-02-28

## 2025-01-31 RX ADMIN — CYANOCOBALAMIN 1000 MCG: 1000 INJECTION, SOLUTION INTRAMUSCULAR at 08:55

## 2025-01-31 NOTE — PROGRESS NOTES
Patient arrived stable and ambulatory for B12 injection.  ID : [id number] utilized for communication. Patient denied any questions or concerns at this time. B12 administered in Right deltoid per patient preference. Bandaid applied over site. Printed AVS given to patient. Patient Dc'd stable and ambulatory.

## 2025-02-03 ENCOUNTER — CARE COORDINATION (OUTPATIENT)
Dept: CASE MANAGEMENT | Age: 35
End: 2025-02-03

## 2025-02-03 NOTE — CARE COORDINATION
Care Transitions Note    Follow Up Call     Attempted to reach patient for transitions of care follow up.  Unable to reach patient.      Outreach Attempts:   HIPAA compliant voicemail left for patient. Via  [place] ID# [id number]     Care Summary Note: UTR    Follow Up Appointment:   Future Appointments         Provider Specialty Dept Phone    2/20/2025 10:45 AM SCHEDULE, [facility] MED ONC LAB Infusion Therapy [phone]    2/27/2025 10:45 AM Mica Izquierdo MD Oncology [phone]    2/27/2025 11:00 AM [facility], MED ONC NURSE Infusion Therapy [phone]    2/28/2025 8:45 AM SCHEDULE, [facility] MED ONC INJECTION Infusion Therapy [phone]    3/28/2025 8:45 AM SCHEDULE, [facility] MED ONC INJECTION Infusion Therapy [phone]            Plan for follow-up on next business day.  based on severity of symptoms and risk factors.     Sasha Claros RN

## 2025-02-04 ENCOUNTER — CARE COORDINATION (OUTPATIENT)
Dept: CASE MANAGEMENT | Age: 35
End: 2025-02-04

## 2025-02-04 NOTE — PROGRESS NOTES
Physician Progress Note      PATIENT:               LESTER SANDS  [facility] #:                  [id number]  :                       1990  ADMIT DATE:       2025 9:42 PM  DISCH DATE:        2025 3:38 PM  RESPONDING  PROVIDER #:        MARIA C PAREKH          QUERY TEXT:    Internal Medicine,    Pt admitted with epigastric pain and had  a dark emesis in the ED.EGD showed   esophagitis and gastritis no bleeding.  If possible, please clarify in   progress notes and discharge summary the cause of the epigastric pain.  :  The medical record reflects the following:  Risk Factors: gastritis and esophagitis  Clinical Indicators: EGD showed LA Grade B esophagitis with no bleeding.-    Gastritis, characterized by erythema and friability.  GI documents upper GIB   2/2 esophagitis,  Treatment: labs, imaging, GI consult, EGD, PPI, medical management    Thank you  Options provided:  -- Epigastric pain  related to due to esophagitis without bleeding confirmed   present on admission  -- Epigastric pain  related to UGI bleed due to esophagitis confirmed present   on admission  -- Epigastric pain related to due to gastritis without bleeding confirmed   present on admission  -- Other - I will add my own diagnosis  -- Disagree - Not applicable / Not valid  -- Disagree - Clinically unable to determine / Unknown  -- Refer to Clinical Documentation Reviewer    PROVIDER RESPONSE TEXT:    Epigastric pain related to due to gastritis with bleeding confirmed present on   admission    Query created by: Mariia Nicholson on 2025 9:47 AM      Electronically signed by:  MARIA C PAREKH 2025 2:52 PM

## 2025-02-04 NOTE — CARE COORDINATION
Care Transitions Note    Follow Up Call     Attempted to reach patient for transitions of care follow up.  Unable to reach patient.      Outreach Attempts:   HIPAA compliant voicemail left for patient. [employer]    Care Summary Note: UTR    Follow Up Appointment:   Future Appointments         Provider Specialty Dept Phone    2/20/2025 10:45 AM SCHEDULE, [facility] MED ONC LAB Infusion Therapy [phone]    2/27/2025 10:45 AM Mica Izquierdo MD Oncology [phone]    2/27/2025 11:00 AM [facility], MED ONC NURSE Infusion Therapy [phone]    2/28/2025 8:45 AM SCHEDULE, [facility] INJECTION Infusion Therapy [phone]    3/28/2025 8:45 AM SCHEDULE, [facility] INJECTION Infusion Therapy [phone]            CTN program closed per protocol  Sasha Claros RN

## 2025-02-20 ENCOUNTER — HOSPITAL ENCOUNTER (OUTPATIENT)
Dept: INFUSION THERAPY | Age: 35
Discharge: HOME | End: 2025-02-20
Payer: COMMERCIAL

## 2025-02-20 DIAGNOSIS — K90.9 INTESTINAL MALABSORPTION, UNSPECIFIED TYPE: ICD-10-CM

## 2025-02-20 DIAGNOSIS — I82.4Y2 ACUTE DEEP VEIN THROMBOSIS (DVT) OF PROXIMAL VEIN OF LEFT LOWER EXTREMITY (HCC): ICD-10-CM

## 2025-02-20 DIAGNOSIS — D50.0 IRON DEFICIENCY ANEMIA DUE TO CHRONIC BLOOD LOSS: ICD-10-CM

## 2025-02-20 LAB
25(OH)D3 SERPL-MCNC: 33.2 NG/ML (ref 30–150)
ALBUMIN SERPL-MCNC: 4.2 G/DL (ref 3.4–5)
ALBUMIN/GLOB SERPL: 1.4 {RATIO} (ref 1.1–2.2)
ALP SERPL-CCNC: 74 U/L (ref 40–129)
ALT SERPL-CCNC: 16 U/L (ref 10–40)
ANION GAP SERPL CALCULATED.3IONS-SCNC: 11 MMOL/L (ref 9–17)
AST SERPL-CCNC: 30 U/L (ref 15–37)
BASOPHILS # BLD: 0.03 K/UL
BASOPHILS NFR BLD: 1 % (ref 0–1)
BILIRUB SERPL-MCNC: 0.8 MG/DL (ref 0–1)
BUN SERPL-MCNC: 14 MG/DL (ref 7–20)
CALCIUM SERPL-MCNC: 9.9 MG/DL (ref 8.3–10.6)
CHLORIDE SERPL-SCNC: 106 MMOL/L (ref 99–110)
CO2 SERPL-SCNC: 24 MMOL/L (ref 21–32)
CREAT SERPL-MCNC: 0.8 MG/DL (ref 0.6–1.1)
EOSINOPHIL # BLD: 0.05 K/UL
EOSINOPHILS RELATIVE PERCENT: 1 % (ref 0–3)
ERYTHROCYTE [DISTWIDTH] IN BLOOD BY AUTOMATED COUNT: 24.9 % (ref 11.7–14.9)
FERRITIN SERPL-MCNC: 49 NG/ML (ref 15–150)
FOLATE SERPL-MCNC: 15.7 NG/ML (ref 4.8–24.2)
GFR, ESTIMATED: 83 ML/MIN/1.73M2
GLUCOSE SERPL-MCNC: 68 MG/DL (ref 74–99)
HCT VFR BLD AUTO: 40.4 % (ref 37–47)
HGB BLD-MCNC: 12.3 G/DL (ref 12.5–16)
LYMPHOCYTES NFR BLD: 1.23 K/UL
LYMPHOCYTES RELATIVE PERCENT: 34 % (ref 24–44)
MCH RBC QN AUTO: 25.5 PG (ref 27–31)
MCHC RBC AUTO-ENTMCNC: 30.4 G/DL (ref 32–36)
MCV RBC AUTO: 83.6 FL (ref 78–100)
MONOCYTES NFR BLD: 0.15 K/UL
MONOCYTES NFR BLD: 4 % (ref 0–4)
NEUTROPHILS NFR BLD: 60 % (ref 36–66)
NEUTS SEG NFR BLD: 2.17 K/UL
PLATELET # BLD AUTO: 259 K/UL (ref 140–440)
PMV BLD AUTO: 11.6 FL (ref 7.5–11.1)
POTASSIUM SERPL-SCNC: 3.7 MMOL/L (ref 3.5–5.1)
PROT SERPL-MCNC: 7.2 G/DL (ref 6.4–8.2)
RBC # BLD AUTO: 4.83 M/UL (ref 4.2–5.4)
SODIUM SERPL-SCNC: 142 MMOL/L (ref 136–145)
VIT B12 SERPL-MCNC: 599 PG/ML (ref 211–911)
WBC OTHER # BLD: 3.6 K/UL (ref 4–10.5)

## 2025-02-20 PROCEDURE — 36415 COLL VENOUS BLD VENIPUNCTURE: CPT

## 2025-02-20 PROCEDURE — 82306 VITAMIN D 25 HYDROXY: CPT

## 2025-02-20 PROCEDURE — 82728 ASSAY OF FERRITIN: CPT

## 2025-02-20 PROCEDURE — 82607 VITAMIN B-12: CPT

## 2025-02-20 PROCEDURE — 80053 COMPREHEN METABOLIC PANEL: CPT

## 2025-02-20 PROCEDURE — 85025 COMPLETE CBC W/AUTO DIFF WBC: CPT

## 2025-02-20 PROCEDURE — 82746 ASSAY OF FOLIC ACID SERUM: CPT

## 2025-02-27 ENCOUNTER — HOSPITAL ENCOUNTER (OUTPATIENT)
Dept: INFUSION THERAPY | Age: 35
Discharge: HOME | End: 2025-02-27
Payer: COMMERCIAL

## 2025-02-27 ENCOUNTER — OFFICE VISIT (OUTPATIENT)
Dept: ONCOLOGY | Age: 35
End: 2025-02-27
Payer: COMMERCIAL

## 2025-02-27 VITALS
BODY MASS INDEX: 20.81 KG/M2 | DIASTOLIC BLOOD PRESSURE: 75 MMHG | HEART RATE: 71 BPM | OXYGEN SATURATION: 100 % | SYSTOLIC BLOOD PRESSURE: 104 MMHG | TEMPERATURE: 98.2 F | WEIGHT: 106 LBS | HEIGHT: 60 IN

## 2025-02-27 DIAGNOSIS — E53.9 VITAMIN B DEFICIENCY: Primary | ICD-10-CM

## 2025-02-27 DIAGNOSIS — D50.0 IRON DEFICIENCY ANEMIA DUE TO CHRONIC BLOOD LOSS: ICD-10-CM

## 2025-02-27 DIAGNOSIS — I82.4Y2 ACUTE DEEP VEIN THROMBOSIS (DVT) OF PROXIMAL VEIN OF LEFT LOWER EXTREMITY (HCC): Primary | ICD-10-CM

## 2025-02-27 DIAGNOSIS — K90.9 INTESTINAL MALABSORPTION, UNSPECIFIED TYPE: ICD-10-CM

## 2025-02-27 PROCEDURE — 99214 OFFICE O/P EST MOD 30 MIN: CPT | Performed by: INTERNAL MEDICINE

## 2025-02-27 PROCEDURE — 96372 THER/PROPH/DIAG INJ SC/IM: CPT

## 2025-02-27 PROCEDURE — 6360000002 HC RX W HCPCS

## 2025-02-27 RX ORDER — EPINEPHRINE 1 MG/ML
0.3 INJECTION, SOLUTION, CONCENTRATE INTRAVENOUS PRN
OUTPATIENT
Start: 2025-03-27

## 2025-02-27 RX ORDER — FAMOTIDINE 10 MG/ML
20 INJECTION, SOLUTION INTRAVENOUS
OUTPATIENT
Start: 2025-03-27

## 2025-02-27 RX ORDER — HYDROCORTISONE SODIUM SUCCINATE 100 MG/2ML
100 INJECTION INTRAMUSCULAR; INTRAVENOUS
OUTPATIENT
Start: 2025-03-27

## 2025-02-27 RX ORDER — CYANOCOBALAMIN 1000 UG/ML
1000 INJECTION, SOLUTION INTRAMUSCULAR; SUBCUTANEOUS ONCE
OUTPATIENT
Start: 2025-03-27 | End: 2025-03-27

## 2025-02-27 RX ORDER — ALBUTEROL SULFATE 90 UG/1
4 INHALANT RESPIRATORY (INHALATION) PRN
OUTPATIENT
Start: 2025-03-27

## 2025-02-27 RX ORDER — ACETAMINOPHEN 325 MG/1
650 TABLET ORAL
OUTPATIENT
Start: 2025-03-27

## 2025-02-27 RX ORDER — SODIUM CHLORIDE 9 MG/ML
INJECTION, SOLUTION INTRAVENOUS CONTINUOUS
OUTPATIENT
Start: 2025-03-27

## 2025-02-27 RX ORDER — ONDANSETRON 2 MG/ML
8 INJECTION INTRAMUSCULAR; INTRAVENOUS
OUTPATIENT
Start: 2025-03-27

## 2025-02-27 RX ORDER — CYANOCOBALAMIN 1000 UG/ML
INJECTION, SOLUTION INTRAMUSCULAR; SUBCUTANEOUS
Status: COMPLETED
Start: 2025-02-27 | End: 2025-02-27

## 2025-02-27 RX ORDER — DIPHENHYDRAMINE HYDROCHLORIDE 50 MG/ML
50 INJECTION INTRAMUSCULAR; INTRAVENOUS
OUTPATIENT
Start: 2025-03-27

## 2025-02-27 RX ORDER — CYANOCOBALAMIN 1000 UG/ML
1000 INJECTION, SOLUTION INTRAMUSCULAR; SUBCUTANEOUS ONCE
Status: COMPLETED | OUTPATIENT
Start: 2025-02-27 | End: 2025-02-27

## 2025-02-27 RX ADMIN — CYANOCOBALAMIN 1000 MCG: 1000 INJECTION, SOLUTION INTRAMUSCULAR; SUBCUTANEOUS at 12:17

## 2025-02-27 RX ADMIN — CYANOCOBALAMIN 1000 MCG: 1000 INJECTION, SOLUTION INTRAMUSCULAR at 12:17

## 2025-02-27 NOTE — PROGRESS NOTES
Pt to tx suite for B-12 injection after OV.  at chairside with pt. Verified name  and what injection she would be receiving. Injection given SQ to left upper arm. Pt tolerated without incident. Pt instructed to stop at check out desk to schedule for next OV and receive discharge paperwork.

## 2025-02-27 NOTE — PROGRESS NOTES
MA Rooming Questions  Patient: Courtney Capone  MRN: [id number]    Date: 2/27/2025        1. Do you have any new issues?   no         2. Do you need any refills on medications?    yes - Eliquis samples; patient states medication for stomach, put it looks like it was prescribed by a PA-C. This MA told patient she can discuss this with Dr. Izquierdo.     3. Have you had any imaging done since your last visit?   yes - while in the ER    4. Have you been hospitalized or seen in the emergency room since your last visit here?   yes - ER 1/6, 1/12, 1/23-1/27    5. Did the patient have a depression screening completed today? No    No data recorded     PHQ-9 Given to (if applicable):               PHQ-9 Score (if applicable):                     [] Positive     []  Negative              Does question #9 need addressed (if applicable)                     [] Yes    []  No               Esme Thomas MA

## 2025-02-27 NOTE — PROGRESS NOTES
Patient Name:  Courtney Capone  Patient :  1990  Patient MRN:  [id number]     Primary Oncologist: Mica Izquierdo MD  Referring Provider: No primary care provider on file.     Date of Service: 2025      Reason for Consult:  Left LE DVT      Chief Complaint:    Chief Complaint   Patient presents with    Follow-up       Encounter Diagnoses   Name Primary?    Acute deep vein thrombosis (DVT) of proximal vein of left lower extremity (HCC) Yes    Iron deficiency anemia due to chronic blood loss     Intestinal malabsorption, unspecified type         HPI:   2024, she arrived with her boyfriend to the clinic today.  Patient Creole speaking.   in the room.  Reported that she noticed edema in the left lower extremity to the foot, painful.  Denied any surgery or injury.  No long distance travel but reported that she drives 1-1/2-hour each way to work.  No prior history of blood clots.  No family history of thromboembolism.  No use of any estrogen or any steroids.  No smoking history.  Denies any chest pain, increased shortness of breath.  Denied any abdominal pain, nausea, vomiting, diarrhea, constipation.  No weight loss.  Ultrasound on 2024 with positive acute proximal DVT in the left lower extremity, no DVT in the calf.  Also superficial thrombophlebitis in the left lower extremity.    2024 CBC with WBC of 3.2 hemoglobin of 8 hematocrit 28.9 MCV of 69.6 and platelets of 458 CMP basically within normal limits beta-2 glycoprotein negative homocysteine levels 7.5 cardiolipin antibodies were ordered but not done Antithrombin III activity 141 factor V Leiden mutation negative MTHFR mutation negative prothrombin gene mutation negative Lupus anticoagulant not detected    2025 CT scan of the abdomen pelvis:IMPRESSION:   1. Diffuse wall thickening in the proximal small bowel with multiple   fluid-filled large and small bowel loops.  There is prominent enhancement of   the small bowel mucosa.

## 2025-03-03 DIAGNOSIS — K90.9 INTESTINAL MALABSORPTION, UNSPECIFIED TYPE: ICD-10-CM

## 2025-03-03 DIAGNOSIS — D50.0 IRON DEFICIENCY ANEMIA DUE TO CHRONIC BLOOD LOSS: ICD-10-CM

## 2025-03-03 DIAGNOSIS — R21 RASH: ICD-10-CM

## 2025-03-03 DIAGNOSIS — I82.4Y2 ACUTE DEEP VEIN THROMBOSIS (DVT) OF PROXIMAL VEIN OF LEFT LOWER EXTREMITY (HCC): Primary | ICD-10-CM

## 2025-03-03 RX ORDER — FERROUS SULFATE 325(65) MG
325 TABLET ORAL EVERY OTHER DAY
Qty: 30 TABLET | Refills: 3 | Status: SHIPPED | OUTPATIENT
Start: 2025-03-03

## 2025-03-03 NOTE — PROGRESS NOTES
This nurse called the patient @ [phone] to review lab results, the use of a telephone  ID # [id number]. Patient was notified of the  physician recommending that she take an oral iron tablet daily. A prescription was e-scribed to [facility] pharmacy. Patient verbalized understanding and denies further needs at this time.

## 2025-03-06 ENCOUNTER — TELEPHONE (OUTPATIENT)
Dept: GASTROENTEROLOGY | Age: 35
End: 2025-03-06

## 2025-03-06 NOTE — TELEPHONE ENCOUNTER
Please notify patient that her EGD biopsies showed  Duodenum, nodular mucosa; biopsy:   - Fragment of duodenal mucosa showing slightly prominent Brunner's glands.  No evidence of celiac disease  -     Villous architecture is present.      Stomach; biopsy:   -     Fragment of gastric mucosa showing mild chronic inflammation with foveolar hyperplasia        and reactive changes.    -     H. pylori immunostain does not reveal definitive   Helicobacter-like microorganisms.       Follow-up in clinic in two weeks

## 2025-03-28 ENCOUNTER — OFFICE VISIT (OUTPATIENT)
Dept: GASTROENTEROLOGY | Age: 35
End: 2025-03-28
Payer: COMMERCIAL

## 2025-03-28 ENCOUNTER — TELEPHONE (OUTPATIENT)
Dept: INFUSION THERAPY | Age: 35
End: 2025-03-28

## 2025-03-28 VITALS
BODY MASS INDEX: 21.68 KG/M2 | SYSTOLIC BLOOD PRESSURE: 122 MMHG | RESPIRATION RATE: 17 BRPM | HEIGHT: 60 IN | HEART RATE: 61 BPM | DIASTOLIC BLOOD PRESSURE: 68 MMHG | OXYGEN SATURATION: 100 % | WEIGHT: 110.4 LBS

## 2025-03-28 DIAGNOSIS — D50.8 IRON DEFICIENCY ANEMIA SECONDARY TO INADEQUATE DIETARY IRON INTAKE: ICD-10-CM

## 2025-03-28 DIAGNOSIS — K29.30 SUPERFICIAL GASTRITIS WITHOUT HEMORRHAGE, UNSPECIFIED CHRONICITY: Primary | ICD-10-CM

## 2025-03-28 DIAGNOSIS — Z90.3 S/P PARTIAL GASTRECTOMY: ICD-10-CM

## 2025-03-28 DIAGNOSIS — R14.0 ABDOMINAL BLOATING: ICD-10-CM

## 2025-03-28 PROCEDURE — 99214 OFFICE O/P EST MOD 30 MIN: CPT | Performed by: INTERNAL MEDICINE

## 2025-03-28 RX ORDER — PANTOPRAZOLE SODIUM 40 MG/1
40 TABLET, DELAYED RELEASE ORAL
Qty: 90 TABLET | Refills: 1 | Status: SHIPPED | OUTPATIENT
Start: 2025-03-28

## 2025-03-28 NOTE — PROGRESS NOTES
[facility]             MD Deandre Plascencia MD Carol Christensen, APRN-CNP       Kassandra Mullen, APRN-CNP             [address] [place] [address]             [phone] fax [phone]        Gastroenterology Clinic Consultation    Clare Leal MD  Encounter Date: 03/28/25     CC: Follow-up (Pt is here to f/u from recent procedure )       No referring provider defined for this encounter.     History obtained from: patient, family, medical records, history of Creole      Subjective:       Courtney Capone is an 34 y.o. [place] Creole female with past medical history of iron deficiency anemia, DVT, prior history of peptic ulcer disease who presents for Follow-up (Pt is here to f/u from recent procedure )    Patient was initially evaluated in January in the hospital when she had presented with coffee-ground emesis, iron deficiency anemia and with a prior history of DVT on Eliquis.  She was initially evaluated by Dr. Yusuf and then subsequently underwent EGD with me on 1/25/2025 which revealed LA grade B esophagitis, gastritis, gastrojejunostomy characterized by healthy mucosa, the pylorus was intact as well and the scope was advanced both into the duodenum and jejunum.  Her biopsy result from the duodenum was noted to be normal.  Gastric biopsies with chronic inflammation and H. pylori negative.     With regards to her iron deficiency anemia the patient did follow-up with hematology.  Hemolytic workup was negative.  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  And has been subsequently continued on iron supplementation    She mentions she is doing well. Eliquis stopped per patient. Mentions she does feel bloated at time.      Patient Active Problem List   Diagnosis    Vitamin B deficiency    Intestinal

## 2025-04-25 ENCOUNTER — HOSPITAL ENCOUNTER (OUTPATIENT)
Dept: INFUSION THERAPY | Age: 35
Discharge: HOME | End: 2025-04-25
Payer: COMMERCIAL

## 2025-04-25 DIAGNOSIS — E53.9 VITAMIN B DEFICIENCY: Primary | ICD-10-CM

## 2025-04-25 PROCEDURE — 6360000002 HC RX W HCPCS: Performed by: INTERNAL MEDICINE

## 2025-04-25 PROCEDURE — 96372 THER/PROPH/DIAG INJ SC/IM: CPT

## 2025-04-25 RX ORDER — HYDROCORTISONE SODIUM SUCCINATE 100 MG/2ML
100 INJECTION INTRAMUSCULAR; INTRAVENOUS
OUTPATIENT
Start: 2025-05-23

## 2025-04-25 RX ORDER — CYANOCOBALAMIN 1000 UG/ML
1000 INJECTION, SOLUTION INTRAMUSCULAR; SUBCUTANEOUS ONCE
OUTPATIENT
Start: 2025-05-23 | End: 2025-05-23

## 2025-04-25 RX ORDER — EPINEPHRINE 1 MG/ML
0.3 INJECTION, SOLUTION, CONCENTRATE INTRAVENOUS PRN
OUTPATIENT
Start: 2025-05-23

## 2025-04-25 RX ORDER — CYANOCOBALAMIN 1000 UG/ML
1000 INJECTION, SOLUTION INTRAMUSCULAR; SUBCUTANEOUS ONCE
Status: COMPLETED | OUTPATIENT
Start: 2025-04-25 | End: 2025-04-25

## 2025-04-25 RX ORDER — ALBUTEROL SULFATE 90 UG/1
4 INHALANT RESPIRATORY (INHALATION) PRN
OUTPATIENT
Start: 2025-05-23

## 2025-04-25 RX ORDER — DIPHENHYDRAMINE HYDROCHLORIDE 50 MG/ML
50 INJECTION, SOLUTION INTRAMUSCULAR; INTRAVENOUS
OUTPATIENT
Start: 2025-05-23

## 2025-04-25 RX ORDER — SODIUM CHLORIDE 9 MG/ML
INJECTION, SOLUTION INTRAVENOUS CONTINUOUS
OUTPATIENT
Start: 2025-05-23

## 2025-04-25 RX ORDER — ONDANSETRON 2 MG/ML
8 INJECTION INTRAMUSCULAR; INTRAVENOUS
OUTPATIENT
Start: 2025-05-23

## 2025-04-25 RX ORDER — FAMOTIDINE 10 MG/ML
20 INJECTION, SOLUTION INTRAVENOUS
OUTPATIENT
Start: 2025-05-23

## 2025-04-25 RX ORDER — ACETAMINOPHEN 325 MG/1
650 TABLET ORAL
OUTPATIENT
Start: 2025-05-23

## 2025-04-25 RX ADMIN — CYANOCOBALAMIN 1000 MCG: 1000 INJECTION, SOLUTION INTRAMUSCULAR at 09:01

## 2025-04-25 NOTE — PROGRESS NOTES
Patient ambulated to infusion area, here today for a B12 injection. [employer] Creole  used during duration of visit.  No concerns at this time. Treatment approved and given in right deltoid. Patient tolerated well. Patient provided discharge instructions. RTC 05/23 for next injection.

## 2025-05-23 ENCOUNTER — HOSPITAL ENCOUNTER (OUTPATIENT)
Dept: INFUSION THERAPY | Age: 35
Discharge: HOME | End: 2025-05-23
Payer: COMMERCIAL

## 2025-05-23 DIAGNOSIS — E53.9 VITAMIN B DEFICIENCY: Primary | ICD-10-CM

## 2025-05-23 PROCEDURE — 96372 THER/PROPH/DIAG INJ SC/IM: CPT

## 2025-05-23 PROCEDURE — 6360000002 HC RX W HCPCS: Performed by: INTERNAL MEDICINE

## 2025-05-23 RX ORDER — HYDROCORTISONE SODIUM SUCCINATE 100 MG/2ML
100 INJECTION INTRAMUSCULAR; INTRAVENOUS
OUTPATIENT
Start: 2025-06-20

## 2025-05-23 RX ORDER — CYANOCOBALAMIN 1000 UG/ML
1000 INJECTION, SOLUTION INTRAMUSCULAR; SUBCUTANEOUS ONCE
OUTPATIENT
Start: 2025-06-20 | End: 2025-06-20

## 2025-05-23 RX ORDER — ALBUTEROL SULFATE 90 UG/1
4 INHALANT RESPIRATORY (INHALATION) PRN
OUTPATIENT
Start: 2025-06-20

## 2025-05-23 RX ORDER — ONDANSETRON 2 MG/ML
8 INJECTION INTRAMUSCULAR; INTRAVENOUS
OUTPATIENT
Start: 2025-06-20

## 2025-05-23 RX ORDER — ACETAMINOPHEN 325 MG/1
650 TABLET ORAL
OUTPATIENT
Start: 2025-06-20

## 2025-05-23 RX ORDER — DIPHENHYDRAMINE HYDROCHLORIDE 50 MG/ML
50 INJECTION, SOLUTION INTRAMUSCULAR; INTRAVENOUS
OUTPATIENT
Start: 2025-06-20

## 2025-05-23 RX ORDER — FAMOTIDINE 10 MG/ML
20 INJECTION, SOLUTION INTRAVENOUS
OUTPATIENT
Start: 2025-06-20

## 2025-05-23 RX ORDER — CYANOCOBALAMIN 1000 UG/ML
1000 INJECTION, SOLUTION INTRAMUSCULAR; SUBCUTANEOUS ONCE
Status: COMPLETED | OUTPATIENT
Start: 2025-05-23 | End: 2025-05-23

## 2025-05-23 RX ORDER — EPINEPHRINE 1 MG/ML
0.3 INJECTION, SOLUTION, CONCENTRATE INTRAVENOUS PRN
OUTPATIENT
Start: 2025-06-20

## 2025-05-23 RX ORDER — SODIUM CHLORIDE 9 MG/ML
INJECTION, SOLUTION INTRAVENOUS CONTINUOUS
OUTPATIENT
Start: 2025-06-20

## 2025-05-23 RX ADMIN — CYANOCOBALAMIN 1000 MCG: 1000 INJECTION, SOLUTION INTRAMUSCULAR at 09:18

## 2025-05-23 NOTE — PROGRESS NOTES
Patient arrived to treatment suite for vitamin B-12 injection.   Denise #[id number] utilized for this visit.  Injection approved and given IM in left deltoid, band-aid applied.  Patient tolerated well.  Left treatment suite ambulatory.  Discharge instructions declined.  RTC 6/13 for labs, 6/20 for injection and OV.

## 2025-06-13 ENCOUNTER — HOSPITAL ENCOUNTER (OUTPATIENT)
Dept: INFUSION THERAPY | Age: 35
Discharge: HOME | End: 2025-06-13
Payer: COMMERCIAL

## 2025-06-13 DIAGNOSIS — I82.4Y2 ACUTE DEEP VEIN THROMBOSIS (DVT) OF PROXIMAL VEIN OF LEFT LOWER EXTREMITY (HCC): ICD-10-CM

## 2025-06-13 DIAGNOSIS — K90.9 INTESTINAL MALABSORPTION, UNSPECIFIED TYPE: ICD-10-CM

## 2025-06-13 DIAGNOSIS — D50.0 IRON DEFICIENCY ANEMIA DUE TO CHRONIC BLOOD LOSS: ICD-10-CM

## 2025-06-13 LAB
25(OH)D3 SERPL-MCNC: 42.2 NG/ML (ref 30–150)
ALBUMIN SERPL-MCNC: 4 G/DL (ref 3.4–5)
ALBUMIN/GLOB SERPL: 1.4 {RATIO} (ref 1.1–2.2)
ALP SERPL-CCNC: 74 U/L (ref 40–129)
ALT SERPL-CCNC: 13 U/L (ref 10–40)
ANION GAP SERPL CALCULATED.3IONS-SCNC: 10 MMOL/L (ref 9–17)
AST SERPL-CCNC: 35 U/L (ref 15–37)
BASOPHILS # BLD: 0.02 K/UL
BASOPHILS NFR BLD: 1 % (ref 0–1)
BILIRUB SERPL-MCNC: 0.8 MG/DL (ref 0–1)
BUN SERPL-MCNC: 14 MG/DL (ref 7–20)
CALCIUM SERPL-MCNC: 9.7 MG/DL (ref 8.3–10.6)
CHLORIDE SERPL-SCNC: 106 MMOL/L (ref 99–110)
CO2 SERPL-SCNC: 23 MMOL/L (ref 21–32)
CREAT SERPL-MCNC: 0.9 MG/DL (ref 0.6–1.1)
EOSINOPHIL # BLD: 0.05 K/UL
EOSINOPHILS RELATIVE PERCENT: 2 % (ref 0–3)
ERYTHROCYTE [DISTWIDTH] IN BLOOD BY AUTOMATED COUNT: 14.9 % (ref 11.7–14.9)
FERRITIN SERPL-MCNC: 21 NG/ML (ref 15–150)
FOLATE SERPL-MCNC: 19.5 NG/ML (ref 4.8–24.2)
GFR, ESTIMATED: 77 ML/MIN/1.73M2
GLUCOSE SERPL-MCNC: 82 MG/DL (ref 74–99)
HCT VFR BLD AUTO: 40.1 % (ref 37–47)
HGB BLD-MCNC: 12.4 G/DL (ref 12.5–16)
IRON SATN MFR SERPL: 19 % (ref 15–50)
IRON SERPL-MCNC: 84 UG/DL (ref 37–145)
LYMPHOCYTES NFR BLD: 1.58 K/UL
LYMPHOCYTES RELATIVE PERCENT: 50 % (ref 24–44)
MCH RBC QN AUTO: 27.2 PG (ref 27–31)
MCHC RBC AUTO-ENTMCNC: 30.9 G/DL (ref 32–36)
MCV RBC AUTO: 87.9 FL (ref 78–100)
MONOCYTES NFR BLD: 0.28 K/UL
MONOCYTES NFR BLD: 9 % (ref 0–5)
NEUTROPHILS NFR BLD: 39 % (ref 36–66)
NEUTS SEG NFR BLD: 1.22 K/UL
PLATELET # BLD AUTO: 77 K/UL (ref 140–440)
PMV BLD AUTO: 13 FL (ref 7.5–11.1)
POTASSIUM SERPL-SCNC: 4.7 MMOL/L (ref 3.5–5.1)
PROT SERPL-MCNC: 6.9 G/DL (ref 6.4–8.2)
RBC # BLD AUTO: 4.56 M/UL (ref 4.2–5.4)
SODIUM SERPL-SCNC: 139 MMOL/L (ref 136–145)
TIBC SERPL-MCNC: 442 UG/DL (ref 260–445)
UNSATURATED IRON BINDING CAPACITY: 358 UG/DL (ref 110–370)
VIT B12 SERPL-MCNC: 817 PG/ML (ref 211–911)
WBC OTHER # BLD: 3.2 K/UL (ref 4–10.5)

## 2025-06-13 PROCEDURE — 83540 ASSAY OF IRON: CPT

## 2025-06-13 PROCEDURE — 80053 COMPREHEN METABOLIC PANEL: CPT

## 2025-06-13 PROCEDURE — 85025 COMPLETE CBC W/AUTO DIFF WBC: CPT

## 2025-06-13 PROCEDURE — 82728 ASSAY OF FERRITIN: CPT

## 2025-06-13 PROCEDURE — 36415 COLL VENOUS BLD VENIPUNCTURE: CPT

## 2025-06-13 PROCEDURE — 82607 VITAMIN B-12: CPT

## 2025-06-13 PROCEDURE — 83550 IRON BINDING TEST: CPT

## 2025-06-13 PROCEDURE — 82746 ASSAY OF FOLIC ACID SERUM: CPT

## 2025-06-13 PROCEDURE — 82306 VITAMIN D 25 HYDROXY: CPT

## 2025-06-20 ENCOUNTER — HOSPITAL ENCOUNTER (OUTPATIENT)
Dept: INFUSION THERAPY | Age: 35
Discharge: HOME | End: 2025-06-20
Payer: COMMERCIAL

## 2025-06-20 DIAGNOSIS — E53.9 VITAMIN B DEFICIENCY: Primary | ICD-10-CM

## 2025-06-20 PROCEDURE — 6360000002 HC RX W HCPCS: Performed by: INTERNAL MEDICINE

## 2025-06-20 PROCEDURE — 96372 THER/PROPH/DIAG INJ SC/IM: CPT

## 2025-06-20 RX ORDER — ALBUTEROL SULFATE 90 UG/1
4 INHALANT RESPIRATORY (INHALATION) PRN
OUTPATIENT
Start: 2025-07-18

## 2025-06-20 RX ORDER — HYDROCORTISONE SODIUM SUCCINATE 100 MG/2ML
100 INJECTION INTRAMUSCULAR; INTRAVENOUS
OUTPATIENT
Start: 2025-07-18

## 2025-06-20 RX ORDER — CYANOCOBALAMIN 1000 UG/ML
1000 INJECTION, SOLUTION INTRAMUSCULAR; SUBCUTANEOUS ONCE
Status: COMPLETED | OUTPATIENT
Start: 2025-06-20 | End: 2025-06-20

## 2025-06-20 RX ORDER — SODIUM CHLORIDE 9 MG/ML
INJECTION, SOLUTION INTRAVENOUS CONTINUOUS
OUTPATIENT
Start: 2025-07-18

## 2025-06-20 RX ORDER — FAMOTIDINE 10 MG/ML
20 INJECTION, SOLUTION INTRAVENOUS
OUTPATIENT
Start: 2025-07-18

## 2025-06-20 RX ORDER — ONDANSETRON 2 MG/ML
8 INJECTION INTRAMUSCULAR; INTRAVENOUS
OUTPATIENT
Start: 2025-07-18

## 2025-06-20 RX ORDER — DIPHENHYDRAMINE HYDROCHLORIDE 50 MG/ML
50 INJECTION, SOLUTION INTRAMUSCULAR; INTRAVENOUS
OUTPATIENT
Start: 2025-07-18

## 2025-06-20 RX ORDER — ACETAMINOPHEN 325 MG/1
650 TABLET ORAL
OUTPATIENT
Start: 2025-07-18

## 2025-06-20 RX ORDER — EPINEPHRINE 1 MG/ML
0.3 INJECTION, SOLUTION, CONCENTRATE INTRAVENOUS PRN
OUTPATIENT
Start: 2025-07-18

## 2025-06-20 RX ORDER — CYANOCOBALAMIN 1000 UG/ML
1000 INJECTION, SOLUTION INTRAMUSCULAR; SUBCUTANEOUS ONCE
OUTPATIENT
Start: 2025-07-18 | End: 2025-07-18

## 2025-06-20 RX ADMIN — CYANOCOBALAMIN 1000 MCG: 1000 INJECTION, SOLUTION INTRAMUSCULAR at 09:26

## 2025-06-20 NOTE — CONSULTS
Session ID: [id number]  Session Duration: 8 minutes  Language: Cambodian [place]   ID: #[id number]   Name: Ge

## 2025-06-20 NOTE — PROGRESS NOTES
Pt to tx suite for B-12 injection.  # [id number] used at all times. Pt verified her name  and what injection she would be receiving today. Pt has no concerns or issues to discuss at this time. Injection given IM to left deltoid. Pt tolerated without incident. Left ambulatory discharge instructions given in Turkmen language. Pt walked to check out to reschedule next OV. Next B-12 scheduled correctly.

## 2025-06-30 ENCOUNTER — HOSPITAL ENCOUNTER (OUTPATIENT)
Dept: INFUSION THERAPY | Age: 35
Discharge: HOME | End: 2025-06-30
Payer: COMMERCIAL

## 2025-06-30 ENCOUNTER — OFFICE VISIT (OUTPATIENT)
Dept: ONCOLOGY | Age: 35
End: 2025-06-30
Payer: COMMERCIAL

## 2025-06-30 VITALS
BODY MASS INDEX: 23.87 KG/M2 | DIASTOLIC BLOOD PRESSURE: 74 MMHG | OXYGEN SATURATION: 100 % | SYSTOLIC BLOOD PRESSURE: 111 MMHG | TEMPERATURE: 98.9 F | HEIGHT: 60 IN | HEART RATE: 89 BPM | WEIGHT: 121.6 LBS

## 2025-06-30 DIAGNOSIS — I82.4Y2 ACUTE DEEP VEIN THROMBOSIS (DVT) OF PROXIMAL VEIN OF LEFT LOWER EXTREMITY (HCC): ICD-10-CM

## 2025-06-30 DIAGNOSIS — D70.9 NEUTROPENIA, UNSPECIFIED TYPE: ICD-10-CM

## 2025-06-30 DIAGNOSIS — D69.6 THROMBOCYTOPENIA: ICD-10-CM

## 2025-06-30 DIAGNOSIS — D50.0 IRON DEFICIENCY ANEMIA DUE TO CHRONIC BLOOD LOSS: Primary | ICD-10-CM

## 2025-06-30 DIAGNOSIS — K90.9 INTESTINAL MALABSORPTION, UNSPECIFIED TYPE: ICD-10-CM

## 2025-06-30 DIAGNOSIS — D50.0 IRON DEFICIENCY ANEMIA DUE TO CHRONIC BLOOD LOSS: ICD-10-CM

## 2025-06-30 LAB
BASOPHILS # BLD: 0.02 K/UL
BASOPHILS NFR BLD: 0 % (ref 0–1)
EOSINOPHIL # BLD: 0.03 K/UL
EOSINOPHILS RELATIVE PERCENT: 1 % (ref 0–3)
ERYTHROCYTE [DISTWIDTH] IN BLOOD BY AUTOMATED COUNT: 14.5 % (ref 11.7–14.9)
FERRITIN SERPL-MCNC: 16 NG/ML (ref 15–150)
HAPTOGLOB SERPL-MCNC: 78 MG/DL (ref 30–200)
HCT VFR BLD AUTO: 37.2 % (ref 37–47)
HGB BLD-MCNC: 11.7 G/DL (ref 12.5–16)
LDH SERPL-CCNC: 148 U/L (ref 100–190)
LYMPHOCYTES NFR BLD: 1.64 K/UL
LYMPHOCYTES RELATIVE PERCENT: 33 % (ref 24–44)
MCH RBC QN AUTO: 27.3 PG (ref 27–31)
MCHC RBC AUTO-ENTMCNC: 31.5 G/DL (ref 32–36)
MCV RBC AUTO: 86.9 FL (ref 78–100)
MONOCYTES NFR BLD: 0.22 K/UL
MONOCYTES NFR BLD: 5 % (ref 0–5)
NEUTROPHILS NFR BLD: 61 % (ref 36–66)
NEUTS SEG NFR BLD: 3.02 K/UL
PLATELET # BLD AUTO: 226 K/UL (ref 140–440)
PMV BLD AUTO: 12.5 FL (ref 7.5–11.1)
RBC # BLD AUTO: 4.28 M/UL (ref 4.2–5.4)
RETICS # AUTO: 0.08 M/UL
RETICS/RBC NFR AUTO: 2 % (ref 0.2–2)
WBC OTHER # BLD: 4.9 K/UL (ref 4–10.5)

## 2025-06-30 PROCEDURE — 84165 PROTEIN E-PHORESIS SERUM: CPT

## 2025-06-30 PROCEDURE — 82728 ASSAY OF FERRITIN: CPT

## 2025-06-30 PROCEDURE — 99214 OFFICE O/P EST MOD 30 MIN: CPT | Performed by: INTERNAL MEDICINE

## 2025-06-30 PROCEDURE — 84155 ASSAY OF PROTEIN SERUM: CPT

## 2025-06-30 PROCEDURE — 85025 COMPLETE CBC W/AUTO DIFF WBC: CPT

## 2025-06-30 PROCEDURE — 83010 ASSAY OF HAPTOGLOBIN QUANT: CPT

## 2025-06-30 PROCEDURE — 99212 OFFICE O/P EST SF 10 MIN: CPT

## 2025-06-30 PROCEDURE — 83615 LACTATE (LD) (LDH) ENZYME: CPT

## 2025-06-30 PROCEDURE — 36415 COLL VENOUS BLD VENIPUNCTURE: CPT

## 2025-06-30 PROCEDURE — 85045 AUTOMATED RETICULOCYTE COUNT: CPT

## 2025-06-30 ASSESSMENT — PATIENT HEALTH QUESTIONNAIRE - PHQ9
SUM OF ALL RESPONSES TO PHQ QUESTIONS 1-9: 0
2. FEELING DOWN, DEPRESSED OR HOPELESS: NOT AT ALL
SUM OF ALL RESPONSES TO PHQ QUESTIONS 1-9: 0
1. LITTLE INTEREST OR PLEASURE IN DOING THINGS: NOT AT ALL

## 2025-06-30 NOTE — CONSULTS
Session ID: [id number]  Session Duration: Longer than 53 minutes  Language: Jacob Stokes   ID: #[id number]   Name: Roseage: Jacob Stokes   ID: #[id number]   Name: Kaci

## 2025-06-30 NOTE — PROGRESS NOTES
Patient Name:  Courtney Capone  Patient :  1990  Patient MRN:  [id number]     Primary Oncologist: Mica Izquierdo MD  Referring Provider: No primary care provider on file.     Date of Service: 2025      Reason for Consult:  Left LE DVT      Chief Complaint:    Chief Complaint   Patient presents with    Follow-up       Encounter Diagnoses   Name Primary?    Iron deficiency anemia due to chronic blood loss Yes    Intestinal malabsorption, unspecified type     Neutropenia, unspecified type     Thrombocytopenia           HPI:   2024, she arrived with her boyfriend to the clinic today.  Patient Creole speaking.   in the room.  Reported that she noticed edema in the left lower extremity to the foot, painful.  Denied any surgery or injury.  No long distance travel but reported that she drives 1-1/2-hour each way to work.  No prior history of blood clots.  No family history of thromboembolism.  No use of any estrogen or any steroids.  No smoking history.  Denies any chest pain, increased shortness of breath.  Denied any abdominal pain, nausea, vomiting, diarrhea, constipation.  No weight loss.  Ultrasound on 2024 with positive acute proximal DVT in the left lower extremity, no DVT in the calf.  Also superficial thrombophlebitis in the left lower extremity.    2024 CBC with WBC of 3.2 hemoglobin of 8 hematocrit 28.9 MCV of 69.6 and platelets of 458 CMP basically within normal limits beta-2 glycoprotein negative homocysteine levels 7.5 cardiolipin antibodies were ordered but not done Antithrombin III activity 141 factor V Leiden mutation negative MTHFR mutation negative prothrombin gene mutation negative Lupus anticoagulant not detected    2025 CT scan of the abdomen pelvis:IMPRESSION:   1. Diffuse wall thickening in the proximal small bowel with multiple   fluid-filled large and small bowel loops.  There is prominent enhancement of   the small bowel mucosa. Findings are concerning

## 2025-06-30 NOTE — PROGRESS NOTES
MA/LPN Rooming Questions  Patient: Courtney Capone  MRN: [id number]    Date: 6/30/2025        1. Do you have any new issues?   no         2. Do you need any refills on medications?    no    3. Have you had any imaging done since your last visit?   no    4. Have you been hospitalized or seen in the emergency room since your last visit here?   no    5. Did the patient have a depression screening completed today? Yes    PHQ-9 Total Score: 0 (6/30/2025  3:53 PM)       PHQ-9 Given to (if applicable):               PHQ-9 Score (if applicable):                     [] Positive     []  Negative              Does question #9 need addressed (if applicable)                     [] Yes    []  No               Patti Mojica MA

## 2025-07-01 DIAGNOSIS — D50.0 IRON DEFICIENCY ANEMIA DUE TO CHRONIC BLOOD LOSS: Primary | ICD-10-CM

## 2025-07-01 DIAGNOSIS — K90.9 INTESTINAL MALABSORPTION, UNSPECIFIED TYPE: ICD-10-CM

## 2025-07-01 RX ORDER — HEPARIN SODIUM (PORCINE) LOCK FLUSH IV SOLN 100 UNIT/ML 100 UNIT/ML
500 SOLUTION INTRAVENOUS PRN
OUTPATIENT
Start: 2025-07-01

## 2025-07-01 RX ORDER — ONDANSETRON 2 MG/ML
8 INJECTION INTRAMUSCULAR; INTRAVENOUS
OUTPATIENT
Start: 2025-07-01

## 2025-07-01 RX ORDER — SODIUM CHLORIDE 9 MG/ML
INJECTION, SOLUTION INTRAVENOUS PRN
OUTPATIENT
Start: 2025-07-01

## 2025-07-01 RX ORDER — EPINEPHRINE 1 MG/ML
0.3 INJECTION, SOLUTION, CONCENTRATE INTRAVENOUS PRN
OUTPATIENT
Start: 2025-07-01

## 2025-07-01 RX ORDER — MEPERIDINE HYDROCHLORIDE 50 MG/ML
12.5 INJECTION INTRAMUSCULAR; INTRAVENOUS; SUBCUTANEOUS PRN
OUTPATIENT
Start: 2025-07-01

## 2025-07-01 RX ORDER — DIPHENHYDRAMINE HYDROCHLORIDE 50 MG/ML
50 INJECTION, SOLUTION INTRAMUSCULAR; INTRAVENOUS
OUTPATIENT
Start: 2025-07-01

## 2025-07-01 RX ORDER — ALBUTEROL SULFATE 90 UG/1
4 INHALANT RESPIRATORY (INHALATION) PRN
OUTPATIENT
Start: 2025-07-01

## 2025-07-01 RX ORDER — SODIUM CHLORIDE 0.9 % (FLUSH) 0.9 %
5-40 SYRINGE (ML) INJECTION PRN
OUTPATIENT
Start: 2025-07-01

## 2025-07-01 RX ORDER — HYDROCORTISONE SODIUM SUCCINATE 100 MG/2ML
100 INJECTION INTRAMUSCULAR; INTRAVENOUS
OUTPATIENT
Start: 2025-07-01

## 2025-07-01 RX ORDER — SODIUM CHLORIDE 9 MG/ML
5-250 INJECTION, SOLUTION INTRAVENOUS PRN
OUTPATIENT
Start: 2025-07-01

## 2025-07-01 RX ORDER — ACETAMINOPHEN 325 MG/1
650 TABLET ORAL ONCE
OUTPATIENT
Start: 2025-07-01 | End: 2025-07-01

## 2025-07-01 RX ORDER — FAMOTIDINE 10 MG/ML
20 INJECTION, SOLUTION INTRAVENOUS
OUTPATIENT
Start: 2025-07-01

## 2025-07-01 RX ORDER — ACETAMINOPHEN 325 MG/1
650 TABLET ORAL
OUTPATIENT
Start: 2025-07-01

## 2025-07-01 NOTE — PROGRESS NOTES
Patient needs iron infusion for iron deficiency anemia d/t chronic blood loss. Order for Infed 1000 mg x 1 and NN referral placed. Therapy plan added.

## 2025-07-04 LAB
MISCELLANEOUS LAB TEST RESULT: NORMAL
TEST NAME: NORMAL

## 2025-07-22 ENCOUNTER — HOSPITAL ENCOUNTER (OUTPATIENT)
Dept: INFUSION THERAPY | Age: 35
Discharge: HOME | End: 2025-07-22
Payer: COMMERCIAL

## 2025-07-22 DIAGNOSIS — E53.9 VITAMIN B DEFICIENCY: Primary | ICD-10-CM

## 2025-07-22 PROCEDURE — 6360000002 HC RX W HCPCS

## 2025-07-22 PROCEDURE — 96372 THER/PROPH/DIAG INJ SC/IM: CPT

## 2025-07-22 RX ORDER — CYANOCOBALAMIN 1000 UG/ML
1000 INJECTION, SOLUTION INTRAMUSCULAR; SUBCUTANEOUS ONCE
Status: COMPLETED | OUTPATIENT
Start: 2025-07-22 | End: 2025-07-22

## 2025-07-22 RX ORDER — SODIUM CHLORIDE 9 MG/ML
INJECTION, SOLUTION INTRAVENOUS CONTINUOUS
OUTPATIENT
Start: 2025-08-12

## 2025-07-22 RX ORDER — ACETAMINOPHEN 325 MG/1
650 TABLET ORAL
OUTPATIENT
Start: 2025-08-12

## 2025-07-22 RX ORDER — HYDROCORTISONE SODIUM SUCCINATE 100 MG/2ML
100 INJECTION INTRAMUSCULAR; INTRAVENOUS
OUTPATIENT
Start: 2025-08-12

## 2025-07-22 RX ORDER — ALBUTEROL SULFATE 90 UG/1
4 INHALANT RESPIRATORY (INHALATION) PRN
OUTPATIENT
Start: 2025-08-12

## 2025-07-22 RX ORDER — CYANOCOBALAMIN 1000 UG/ML
INJECTION, SOLUTION INTRAMUSCULAR; SUBCUTANEOUS
Status: COMPLETED
Start: 2025-07-22 | End: 2025-07-22

## 2025-07-22 RX ORDER — EPINEPHRINE 1 MG/ML
0.3 INJECTION, SOLUTION, CONCENTRATE INTRAVENOUS PRN
OUTPATIENT
Start: 2025-08-12

## 2025-07-22 RX ORDER — CYANOCOBALAMIN 1000 UG/ML
1000 INJECTION, SOLUTION INTRAMUSCULAR; SUBCUTANEOUS ONCE
OUTPATIENT
Start: 2025-08-12 | End: 2025-08-12

## 2025-07-22 RX ORDER — DIPHENHYDRAMINE HYDROCHLORIDE 50 MG/ML
50 INJECTION, SOLUTION INTRAMUSCULAR; INTRAVENOUS
OUTPATIENT
Start: 2025-08-12

## 2025-07-22 RX ORDER — ONDANSETRON 2 MG/ML
8 INJECTION INTRAMUSCULAR; INTRAVENOUS
OUTPATIENT
Start: 2025-08-12

## 2025-07-22 RX ORDER — FAMOTIDINE 10 MG/ML
20 INJECTION, SOLUTION INTRAVENOUS
OUTPATIENT
Start: 2025-08-12

## 2025-07-22 RX ADMIN — CYANOCOBALAMIN 1000 MCG: 1000 INJECTION, SOLUTION INTRAMUSCULAR at 15:12

## 2025-07-22 RX ADMIN — CYANOCOBALAMIN 1000 MCG: 1000 INJECTION, SOLUTION INTRAMUSCULAR; SUBCUTANEOUS at 15:12

## 2025-07-22 NOTE — CONSULTS
Session ID: [id number]  Session Duration: 16 minutes  Language: Jacob Stokes   ID: #[id number]   Name: Deena

## 2025-07-22 NOTE — PROGRESS NOTES
Patient ambulated to infusion area, here today for a B12 injection. No concerns at this time. Treatment approved and given in left deltiod. Patient tolerated well. Patient declined discharge instructions.

## 2025-07-23 ENCOUNTER — TELEPHONE (OUTPATIENT)
Dept: ONCOLOGY | Age: 35
End: 2025-07-23

## 2025-07-23 NOTE — TELEPHONE ENCOUNTER
Tried calling patient to schedule iron infusion through  service, no answer and had to leave a VM to call me back, left my direct phone number.

## 2025-08-06 ENCOUNTER — CLINICAL DOCUMENTATION (OUTPATIENT)
Dept: INFUSION THERAPY | Age: 35
End: 2025-08-06

## 2025-08-19 ENCOUNTER — HOSPITAL ENCOUNTER (OUTPATIENT)
Dept: INFUSION THERAPY | Age: 35
Discharge: HOME | End: 2025-08-19
Payer: COMMERCIAL

## 2025-08-19 DIAGNOSIS — E53.9 VITAMIN B DEFICIENCY: Primary | ICD-10-CM

## 2025-08-19 PROCEDURE — 6360000002 HC RX W HCPCS: Performed by: INTERNAL MEDICINE

## 2025-08-19 PROCEDURE — 96372 THER/PROPH/DIAG INJ SC/IM: CPT

## 2025-08-19 RX ORDER — CYANOCOBALAMIN 1000 UG/ML
1000 INJECTION, SOLUTION INTRAMUSCULAR; SUBCUTANEOUS ONCE
OUTPATIENT
Start: 2025-09-16 | End: 2025-09-16

## 2025-08-19 RX ORDER — CYANOCOBALAMIN 1000 UG/ML
1000 INJECTION, SOLUTION INTRAMUSCULAR; SUBCUTANEOUS ONCE
Status: COMPLETED | OUTPATIENT
Start: 2025-08-19 | End: 2025-08-19

## 2025-08-19 RX ORDER — ACETAMINOPHEN 325 MG/1
650 TABLET ORAL
OUTPATIENT
Start: 2025-09-16

## 2025-08-19 RX ORDER — DIPHENHYDRAMINE HYDROCHLORIDE 50 MG/ML
50 INJECTION, SOLUTION INTRAMUSCULAR; INTRAVENOUS
OUTPATIENT
Start: 2025-09-16

## 2025-08-19 RX ORDER — EPINEPHRINE 1 MG/ML
0.3 INJECTION, SOLUTION, CONCENTRATE INTRAVENOUS PRN
OUTPATIENT
Start: 2025-09-16

## 2025-08-19 RX ORDER — ONDANSETRON 2 MG/ML
8 INJECTION INTRAMUSCULAR; INTRAVENOUS
OUTPATIENT
Start: 2025-09-16

## 2025-08-19 RX ORDER — HYDROCORTISONE SODIUM SUCCINATE 100 MG/2ML
100 INJECTION INTRAMUSCULAR; INTRAVENOUS
OUTPATIENT
Start: 2025-09-16

## 2025-08-19 RX ORDER — ALBUTEROL SULFATE 90 UG/1
4 INHALANT RESPIRATORY (INHALATION) PRN
OUTPATIENT
Start: 2025-09-16

## 2025-08-19 RX ORDER — FAMOTIDINE 10 MG/ML
20 INJECTION, SOLUTION INTRAVENOUS
OUTPATIENT
Start: 2025-09-16

## 2025-08-19 RX ORDER — SODIUM CHLORIDE 9 MG/ML
INJECTION, SOLUTION INTRAVENOUS CONTINUOUS
OUTPATIENT
Start: 2025-09-16

## 2025-08-19 RX ADMIN — CYANOCOBALAMIN 1000 MCG: 1000 INJECTION, SOLUTION INTRAMUSCULAR at 15:15

## 2025-08-29 ENCOUNTER — HOSPITAL ENCOUNTER (OUTPATIENT)
Dept: INFUSION THERAPY | Age: 35
Discharge: HOME | End: 2025-08-29
Payer: COMMERCIAL

## 2025-08-29 DIAGNOSIS — K90.9 INTESTINAL MALABSORPTION, UNSPECIFIED TYPE: ICD-10-CM

## 2025-08-29 DIAGNOSIS — D70.9 NEUTROPENIA, UNSPECIFIED TYPE: ICD-10-CM

## 2025-08-29 DIAGNOSIS — D50.0 IRON DEFICIENCY ANEMIA DUE TO CHRONIC BLOOD LOSS: ICD-10-CM

## 2025-08-29 DIAGNOSIS — D69.6 THROMBOCYTOPENIA: ICD-10-CM

## 2025-08-29 LAB
ALBUMIN SERPL-MCNC: 4.2 G/DL (ref 3.4–5)
ALBUMIN/GLOB SERPL: 1.5 {RATIO} (ref 1.1–2.2)
ALP SERPL-CCNC: 65 U/L (ref 40–129)
ALT SERPL-CCNC: 10 U/L (ref 10–40)
ANION GAP SERPL CALCULATED.3IONS-SCNC: 10 MMOL/L (ref 9–17)
AST SERPL-CCNC: 22 U/L (ref 15–37)
BASOPHILS # BLD: 0.03 K/UL
BASOPHILS NFR BLD: 1 % (ref 0–1)
BILIRUB SERPL-MCNC: 0.9 MG/DL (ref 0–1)
BUN SERPL-MCNC: 14 MG/DL (ref 7–20)
CALCIUM SERPL-MCNC: 9.1 MG/DL (ref 8.3–10.6)
CHLORIDE SERPL-SCNC: 108 MMOL/L (ref 99–110)
CO2 SERPL-SCNC: 23 MMOL/L (ref 21–32)
CREAT SERPL-MCNC: 1 MG/DL (ref 0.6–1.1)
EOSINOPHIL # BLD: 0.04 K/UL
EOSINOPHILS RELATIVE PERCENT: 1 % (ref 0–3)
ERYTHROCYTE [DISTWIDTH] IN BLOOD BY AUTOMATED COUNT: 15 % (ref 11.7–14.9)
FERRITIN SERPL-MCNC: 14 NG/ML (ref 15–150)
FOLATE SERPL-MCNC: 22.8 NG/ML (ref 4.8–24.2)
GFR, ESTIMATED: 61 ML/MIN/1.73M2
GLUCOSE SERPL-MCNC: 84 MG/DL (ref 74–99)
HCT VFR BLD AUTO: 38.4 % (ref 37–47)
HGB BLD-MCNC: 12 G/DL (ref 12.5–16)
IRON SATN MFR SERPL: 27 % (ref 15–50)
IRON SERPL-MCNC: 108 UG/DL (ref 37–145)
LYMPHOCYTES NFR BLD: 1.6 K/UL
LYMPHOCYTES RELATIVE PERCENT: 36 % (ref 24–44)
MCH RBC QN AUTO: 26.6 PG (ref 27–31)
MCHC RBC AUTO-ENTMCNC: 31.3 G/DL (ref 32–36)
MCV RBC AUTO: 85.1 FL (ref 78–100)
MONOCYTES NFR BLD: 0.22 K/UL
MONOCYTES NFR BLD: 5 % (ref 0–5)
NEUTROPHILS NFR BLD: 57 % (ref 36–66)
NEUTS SEG NFR BLD: 2.5 K/UL
PLATELET # BLD AUTO: 219 K/UL (ref 140–440)
PMV BLD AUTO: 13.2 FL (ref 7.5–11.1)
POTASSIUM SERPL-SCNC: 4 MMOL/L (ref 3.5–5.1)
PROT SERPL-MCNC: 6.9 G/DL (ref 6.4–8.2)
RBC # BLD AUTO: 4.51 M/UL (ref 4.2–5.4)
SODIUM SERPL-SCNC: 140 MMOL/L (ref 136–145)
TIBC SERPL-MCNC: 405 UG/DL (ref 260–445)
UNSATURATED IRON BINDING CAPACITY: 297 UG/DL (ref 110–370)
VIT B12 SERPL-MCNC: 985 PG/ML (ref 211–911)
WBC OTHER # BLD: 4.4 K/UL (ref 4–10.5)

## 2025-08-29 PROCEDURE — 36415 COLL VENOUS BLD VENIPUNCTURE: CPT

## 2025-08-29 PROCEDURE — 82607 VITAMIN B-12: CPT

## 2025-08-29 PROCEDURE — 80053 COMPREHEN METABOLIC PANEL: CPT

## 2025-08-29 PROCEDURE — 83540 ASSAY OF IRON: CPT

## 2025-08-29 PROCEDURE — 85025 COMPLETE CBC W/AUTO DIFF WBC: CPT

## 2025-08-29 PROCEDURE — 83550 IRON BINDING TEST: CPT

## 2025-08-29 PROCEDURE — 82728 ASSAY OF FERRITIN: CPT

## 2025-08-29 PROCEDURE — 82746 ASSAY OF FOLIC ACID SERUM: CPT

## 2025-09-05 ENCOUNTER — OFFICE VISIT (OUTPATIENT)
Dept: ONCOLOGY | Age: 35
End: 2025-09-05
Payer: COMMERCIAL

## 2025-09-05 ENCOUNTER — HOSPITAL ENCOUNTER (OUTPATIENT)
Dept: INFUSION THERAPY | Age: 35
Discharge: HOME | End: 2025-09-05
Payer: COMMERCIAL

## 2025-09-05 VITALS
OXYGEN SATURATION: 100 % | RESPIRATION RATE: 14 BRPM | TEMPERATURE: 97.8 F | HEART RATE: 81 BPM | DIASTOLIC BLOOD PRESSURE: 68 MMHG | WEIGHT: 126.2 LBS | HEIGHT: 60 IN | BODY MASS INDEX: 24.77 KG/M2 | SYSTOLIC BLOOD PRESSURE: 103 MMHG

## 2025-09-05 DIAGNOSIS — I82.4Y2 ACUTE DEEP VEIN THROMBOSIS (DVT) OF PROXIMAL VEIN OF LEFT LOWER EXTREMITY (HCC): ICD-10-CM

## 2025-09-05 DIAGNOSIS — K90.9 INTESTINAL MALABSORPTION, UNSPECIFIED TYPE: Primary | ICD-10-CM

## 2025-09-05 DIAGNOSIS — D70.9 NEUTROPENIA, UNSPECIFIED TYPE: ICD-10-CM

## 2025-09-05 DIAGNOSIS — D50.0 IRON DEFICIENCY ANEMIA DUE TO CHRONIC BLOOD LOSS: Primary | ICD-10-CM

## 2025-09-05 DIAGNOSIS — D69.6 THROMBOCYTOPENIA: ICD-10-CM

## 2025-09-05 DIAGNOSIS — K90.9 INTESTINAL MALABSORPTION, UNSPECIFIED TYPE: ICD-10-CM

## 2025-09-05 DIAGNOSIS — D50.0 IRON DEFICIENCY ANEMIA DUE TO CHRONIC BLOOD LOSS: ICD-10-CM

## 2025-09-05 PROCEDURE — 6360000002 HC RX W HCPCS: Performed by: INTERNAL MEDICINE

## 2025-09-05 PROCEDURE — 6370000000 HC RX 637 (ALT 250 FOR IP): Performed by: INTERNAL MEDICINE

## 2025-09-05 PROCEDURE — 2580000003 HC RX 258: Performed by: INTERNAL MEDICINE

## 2025-09-05 PROCEDURE — 96366 THER/PROPH/DIAG IV INF ADDON: CPT

## 2025-09-05 PROCEDURE — 96365 THER/PROPH/DIAG IV INF INIT: CPT

## 2025-09-05 PROCEDURE — 96375 TX/PRO/DX INJ NEW DRUG ADDON: CPT

## 2025-09-05 PROCEDURE — 99214 OFFICE O/P EST MOD 30 MIN: CPT | Performed by: INTERNAL MEDICINE

## 2025-09-05 RX ORDER — EPINEPHRINE 1 MG/ML
0.3 INJECTION, SOLUTION, CONCENTRATE INTRAVENOUS PRN
Status: CANCELLED | OUTPATIENT
Start: 2025-09-05

## 2025-09-05 RX ORDER — DEXAMETHASONE SODIUM PHOSPHATE 10 MG/ML
10 INJECTION, SOLUTION INTRA-ARTICULAR; INTRALESIONAL; INTRAMUSCULAR; INTRAVENOUS; SOFT TISSUE ONCE
Status: COMPLETED | OUTPATIENT
Start: 2025-09-05 | End: 2025-09-05

## 2025-09-05 RX ORDER — ACETAMINOPHEN 325 MG/1
650 TABLET ORAL
Status: CANCELLED | OUTPATIENT
Start: 2025-09-05

## 2025-09-05 RX ORDER — SODIUM CHLORIDE 0.9 % (FLUSH) 0.9 %
5-40 SYRINGE (ML) INJECTION PRN
Status: CANCELLED | OUTPATIENT
Start: 2025-09-05

## 2025-09-05 RX ORDER — HYDROCORTISONE SODIUM SUCCINATE 100 MG/2ML
100 INJECTION INTRAMUSCULAR; INTRAVENOUS
Status: CANCELLED | OUTPATIENT
Start: 2025-09-05

## 2025-09-05 RX ORDER — SODIUM CHLORIDE 0.9 % (FLUSH) 0.9 %
5-40 SYRINGE (ML) INJECTION PRN
Status: DISCONTINUED | OUTPATIENT
Start: 2025-09-05 | End: 2025-09-06 | Stop reason: HOSPADM

## 2025-09-05 RX ORDER — SODIUM CHLORIDE 9 MG/ML
5-250 INJECTION, SOLUTION INTRAVENOUS PRN
Status: CANCELLED | OUTPATIENT
Start: 2025-09-05

## 2025-09-05 RX ORDER — ACETAMINOPHEN 325 MG/1
650 TABLET ORAL ONCE
Status: CANCELLED | OUTPATIENT
Start: 2025-09-05 | End: 2025-09-05

## 2025-09-05 RX ORDER — ALBUTEROL SULFATE 90 UG/1
4 INHALANT RESPIRATORY (INHALATION) PRN
Status: CANCELLED | OUTPATIENT
Start: 2025-09-05

## 2025-09-05 RX ORDER — SODIUM CHLORIDE 9 MG/ML
5-250 INJECTION, SOLUTION INTRAVENOUS PRN
Status: DISCONTINUED | OUTPATIENT
Start: 2025-09-05 | End: 2025-09-06 | Stop reason: HOSPADM

## 2025-09-05 RX ORDER — FAMOTIDINE 10 MG/ML
20 INJECTION, SOLUTION INTRAVENOUS
Status: CANCELLED | OUTPATIENT
Start: 2025-09-05

## 2025-09-05 RX ORDER — ONDANSETRON 2 MG/ML
8 INJECTION INTRAMUSCULAR; INTRAVENOUS
Status: CANCELLED | OUTPATIENT
Start: 2025-09-05

## 2025-09-05 RX ORDER — SODIUM CHLORIDE 9 MG/ML
INJECTION, SOLUTION INTRAVENOUS PRN
Status: CANCELLED | OUTPATIENT
Start: 2025-09-05

## 2025-09-05 RX ORDER — MEPERIDINE HYDROCHLORIDE 25 MG/ML
12.5 INJECTION INTRAMUSCULAR; INTRAVENOUS; SUBCUTANEOUS PRN
Status: CANCELLED | OUTPATIENT
Start: 2025-09-05

## 2025-09-05 RX ORDER — DIPHENHYDRAMINE HYDROCHLORIDE 50 MG/ML
50 INJECTION, SOLUTION INTRAMUSCULAR; INTRAVENOUS
Status: CANCELLED | OUTPATIENT
Start: 2025-09-05

## 2025-09-05 RX ORDER — DEXAMETHASONE SODIUM PHOSPHATE 10 MG/ML
10 INJECTION, SOLUTION INTRA-ARTICULAR; INTRALESIONAL; INTRAMUSCULAR; INTRAVENOUS; SOFT TISSUE ONCE
Status: CANCELLED | OUTPATIENT
Start: 2025-09-05 | End: 2025-09-05

## 2025-09-05 RX ORDER — HEPARIN 100 UNIT/ML
500 SYRINGE INTRAVENOUS PRN
Status: CANCELLED | OUTPATIENT
Start: 2025-09-05

## 2025-09-05 RX ORDER — ACETAMINOPHEN 325 MG/1
650 TABLET ORAL ONCE
Status: COMPLETED | OUTPATIENT
Start: 2025-09-05 | End: 2025-09-05

## 2025-09-05 RX ADMIN — SODIUM CHLORIDE 1000 MG: 0.9 INJECTION, SOLUTION INTRAVENOUS at 11:17

## 2025-09-05 RX ADMIN — DEXAMETHASONE SODIUM PHOSPHATE 10 MG: 10 INJECTION INTRAMUSCULAR; INTRAVENOUS at 11:17

## 2025-09-05 RX ADMIN — SODIUM CHLORIDE 20 ML/HR: 0.9 INJECTION, SOLUTION INTRAVENOUS at 11:11

## 2025-09-05 RX ADMIN — ACETAMINOPHEN 650 MG: 325 TABLET ORAL at 11:13

## (undated) DEVICE — ENDOSCOPY KIT: Brand: MEDLINE INDUSTRIES, INC.

## (undated) DEVICE — FORCEPS BX L240CM JAW DIA2.8MM L CAP W/ NDL MIC MESH TOOTH